# Patient Record
Sex: MALE | URBAN - METROPOLITAN AREA
[De-identification: names, ages, dates, MRNs, and addresses within clinical notes are randomized per-mention and may not be internally consistent; named-entity substitution may affect disease eponyms.]

---

## 2023-10-02 PROBLEM — E66.01 OBESITY, CLASS III, BMI 40-49.9 (MORBID OBESITY) (MULTI): Status: ACTIVE | Noted: 2023-10-02

## 2023-10-02 PROBLEM — S90.221A SUBUNGUAL HEMATOMA OF TOENAIL OF RIGHT FOOT: Status: ACTIVE | Noted: 2023-10-02

## 2023-10-02 PROBLEM — E66.813 OBESITY, CLASS III, BMI 40-49.9 (MORBID OBESITY): Status: ACTIVE | Noted: 2023-10-02

## 2023-10-03 ENCOUNTER — OFFICE VISIT (OUTPATIENT)
Dept: PRIMARY CARE | Facility: CLINIC | Age: 33
End: 2023-10-03
Payer: COMMERCIAL

## 2023-10-03 VITALS
HEART RATE: 89 BPM | BODY MASS INDEX: 40.66 KG/M2 | OXYGEN SATURATION: 95 % | HEIGHT: 72 IN | WEIGHT: 300.2 LBS | TEMPERATURE: 97.1 F

## 2023-10-03 DIAGNOSIS — J02.9 PHARYNGITIS, UNSPECIFIED ETIOLOGY: Primary | ICD-10-CM

## 2023-10-03 PROCEDURE — 99213 OFFICE O/P EST LOW 20 MIN: CPT | Performed by: STUDENT IN AN ORGANIZED HEALTH CARE EDUCATION/TRAINING PROGRAM

## 2023-10-03 PROCEDURE — 1036F TOBACCO NON-USER: CPT | Performed by: STUDENT IN AN ORGANIZED HEALTH CARE EDUCATION/TRAINING PROGRAM

## 2023-10-03 RX ORDER — LANOLIN ALCOHOL/MO/W.PET/CERES
1 CREAM (GRAM) TOPICAL DAILY
COMMUNITY

## 2023-10-03 RX ORDER — MULTIVITAMIN
1 TABLET ORAL DAILY
COMMUNITY

## 2023-10-03 RX ORDER — AMOXICILLIN AND CLAVULANATE POTASSIUM 875; 125 MG/1; MG/1
875 TABLET, FILM COATED ORAL 2 TIMES DAILY
Qty: 20 TABLET | Refills: 0 | Status: SHIPPED | OUTPATIENT
Start: 2023-10-03 | End: 2023-10-13

## 2023-10-03 RX ORDER — ASPIRIN 325 MG
1 TABLET, DELAYED RELEASE (ENTERIC COATED) ORAL DAILY
COMMUNITY

## 2023-10-03 ASSESSMENT — ENCOUNTER SYMPTOMS
HOARSE VOICE: 0
COUGH: 0
VOMITING: 0
SHORTNESS OF BREATH: 0
NECK PAIN: 0
TROUBLE SWALLOWING: 0
DIARRHEA: 0
HEADACHES: 0
ABDOMINAL PAIN: 0
SWOLLEN GLANDS: 0
STRIDOR: 0
SORE THROAT: 1

## 2023-10-03 ASSESSMENT — PAIN SCALES - GENERAL: PAINLEVEL: 4

## 2023-10-03 NOTE — PROGRESS NOTES
"Subjective   Patient ID: Wisam Corley is a 32 y.o. male who presents for Sore Throat.    Sore Throat   This is a new problem. The current episode started in the past 7 days. The problem has been gradually worsening. There has been no fever. The pain is at a severity of 3/10. The pain is mild. Pertinent negatives include no abdominal pain, congestion, coughing, diarrhea, drooling, ear discharge, ear pain, headaches, hoarse voice, plugged ear sensation, neck pain, shortness of breath, stridor, swollen glands, trouble swallowing or vomiting. He has tried nothing for the symptoms.        Review of Systems   HENT:  Positive for sore throat. Negative for congestion, drooling, ear discharge, ear pain, hoarse voice and trouble swallowing.    Respiratory:  Negative for cough, shortness of breath and stridor.    Gastrointestinal:  Negative for abdominal pain, diarrhea and vomiting.   Musculoskeletal:  Negative for neck pain.   Neurological:  Negative for headaches.       Objective   Pulse 89   Temp 36.2 °C (97.1 °F)   Ht 1.816 m (5' 11.5\")   Wt 136 kg (300 lb 3.2 oz)   SpO2 95%   BMI 41.29 kg/m²     Physical Exam  HENT:      Head: Normocephalic and atraumatic.      Right Ear: Tympanic membrane, ear canal and external ear normal.      Left Ear: Tympanic membrane, ear canal and external ear normal.      Nose: Nose normal.      Mouth/Throat:      Mouth: Mucous membranes are moist.      Pharynx: Oropharyngeal exudate and posterior oropharyngeal erythema present.   Eyes:      Conjunctiva/sclera: Conjunctivae normal.   Cardiovascular:      Rate and Rhythm: Normal rate and regular rhythm.      Pulses: Normal pulses.   Pulmonary:      Effort: Pulmonary effort is normal.      Breath sounds: Normal breath sounds.   Abdominal:      General: Abdomen is flat.      Palpations: Abdomen is soft.   Skin:     General: Skin is warm and dry.   Neurological:      General: No focal deficit present.      Mental Status: He is alert. "   Psychiatric:         Mood and Affect: Mood normal.         Behavior: Behavior normal.         Thought Content: Thought content normal.         Judgment: Judgment normal.         Assessment/Plan   Start the patient on oral antibiotics.  Consistent for concerns of strep throat.  If not better by next week to return for reevaluation

## 2023-12-11 ENCOUNTER — OFFICE VISIT (OUTPATIENT)
Dept: PRIMARY CARE | Facility: CLINIC | Age: 33
End: 2023-12-11
Payer: COMMERCIAL

## 2023-12-11 ENCOUNTER — LAB (OUTPATIENT)
Dept: LAB | Facility: LAB | Age: 33
End: 2023-12-11
Payer: COMMERCIAL

## 2023-12-11 VITALS
HEART RATE: 97 BPM | BODY MASS INDEX: 41.58 KG/M2 | OXYGEN SATURATION: 94 % | WEIGHT: 297 LBS | SYSTOLIC BLOOD PRESSURE: 110 MMHG | DIASTOLIC BLOOD PRESSURE: 62 MMHG | HEIGHT: 71 IN

## 2023-12-11 DIAGNOSIS — J02.0 RECURRENT STREPTOCOCCAL PHARYNGITIS: Primary | ICD-10-CM

## 2023-12-11 DIAGNOSIS — Z13.1 SCREENING FOR DIABETES MELLITUS: ICD-10-CM

## 2023-12-11 DIAGNOSIS — E55.9 VITAMIN D DEFICIENCY: ICD-10-CM

## 2023-12-11 DIAGNOSIS — Z13.29 SCREENING FOR THYROID DISORDER: ICD-10-CM

## 2023-12-11 DIAGNOSIS — J02.0 RECURRENT STREPTOCOCCAL PHARYNGITIS: ICD-10-CM

## 2023-12-11 DIAGNOSIS — Z13.220 SCREENING FOR LIPID DISORDERS: ICD-10-CM

## 2023-12-11 PROBLEM — B35.4 DERMATOPHYTOSIS OF BODY: Status: ACTIVE | Noted: 2023-12-11

## 2023-12-11 PROBLEM — J45.909 ASTHMA (HHS-HCC): Status: ACTIVE | Noted: 2023-12-11

## 2023-12-11 PROBLEM — S66.126A LACERATION OF FLEXOR MUSCLE, FASCIA AND TENDON OF RIGHT LITTLE FINGER AT WRIST AND HAND LEVEL, INITIAL ENCOUNTER: Status: ACTIVE | Noted: 2020-11-10

## 2023-12-11 LAB
25(OH)D3 SERPL-MCNC: 16 NG/ML (ref 30–100)
ALBUMIN SERPL BCP-MCNC: 4.7 G/DL (ref 3.4–5)
ALP SERPL-CCNC: 45 U/L (ref 33–120)
ALT SERPL W P-5'-P-CCNC: 20 U/L (ref 10–52)
ANION GAP SERPL CALC-SCNC: 10 MMOL/L (ref 10–20)
AST SERPL W P-5'-P-CCNC: 18 U/L (ref 9–39)
BASOPHILS # BLD AUTO: 0.05 X10*3/UL (ref 0–0.1)
BASOPHILS NFR BLD AUTO: 0.4 %
BILIRUB SERPL-MCNC: 0.3 MG/DL (ref 0–1.2)
BUN SERPL-MCNC: 23 MG/DL (ref 6–23)
CALCIUM SERPL-MCNC: 9.9 MG/DL (ref 8.6–10.3)
CHLORIDE SERPL-SCNC: 99 MMOL/L (ref 98–107)
CHOLEST SERPL-MCNC: 242 MG/DL (ref 0–199)
CHOLESTEROL/HDL RATIO: 6.5
CO2 SERPL-SCNC: 32 MMOL/L (ref 21–32)
CREAT SERPL-MCNC: 1.07 MG/DL (ref 0.5–1.3)
EOSINOPHIL # BLD AUTO: 0.11 X10*3/UL (ref 0–0.7)
EOSINOPHIL NFR BLD AUTO: 0.9 %
ERYTHROCYTE [DISTWIDTH] IN BLOOD BY AUTOMATED COUNT: 12.5 % (ref 11.5–14.5)
GFR SERPL CREATININE-BSD FRML MDRD: >90 ML/MIN/1.73M*2
GLUCOSE SERPL-MCNC: 98 MG/DL (ref 74–99)
HCT VFR BLD AUTO: 46 % (ref 41–52)
HDLC SERPL-MCNC: 37.1 MG/DL
HGB BLD-MCNC: 15.1 G/DL (ref 13.5–17.5)
IMM GRANULOCYTES # BLD AUTO: 0.03 X10*3/UL (ref 0–0.7)
IMM GRANULOCYTES NFR BLD AUTO: 0.2 % (ref 0–0.9)
LDLC SERPL CALC-MCNC: 134 MG/DL
LYMPHOCYTES # BLD AUTO: 2.4 X10*3/UL (ref 1.2–4.8)
LYMPHOCYTES NFR BLD AUTO: 19.1 %
MCH RBC QN AUTO: 29.8 PG (ref 26–34)
MCHC RBC AUTO-ENTMCNC: 32.8 G/DL (ref 32–36)
MCV RBC AUTO: 91 FL (ref 80–100)
MONOCYTES # BLD AUTO: 0.72 X10*3/UL (ref 0.1–1)
MONOCYTES NFR BLD AUTO: 5.7 %
NEUTROPHILS # BLD AUTO: 9.27 X10*3/UL (ref 1.2–7.7)
NEUTROPHILS NFR BLD AUTO: 73.7 %
NON HDL CHOLESTEROL: 205 MG/DL (ref 0–149)
NRBC BLD-RTO: 0 /100 WBCS (ref 0–0)
PLATELET # BLD AUTO: 363 X10*3/UL (ref 150–450)
POTASSIUM SERPL-SCNC: 4.3 MMOL/L (ref 3.5–5.3)
PROT SERPL-MCNC: 7.8 G/DL (ref 6.4–8.2)
RBC # BLD AUTO: 5.06 X10*6/UL (ref 4.5–5.9)
SODIUM SERPL-SCNC: 137 MMOL/L (ref 136–145)
TRIGL SERPL-MCNC: 355 MG/DL (ref 0–149)
TSH SERPL-ACNC: 1.11 MIU/L (ref 0.44–3.98)
VLDL: 71 MG/DL (ref 0–40)
WBC # BLD AUTO: 12.6 X10*3/UL (ref 4.4–11.3)

## 2023-12-11 PROCEDURE — 99213 OFFICE O/P EST LOW 20 MIN: CPT | Performed by: STUDENT IN AN ORGANIZED HEALTH CARE EDUCATION/TRAINING PROGRAM

## 2023-12-11 PROCEDURE — 36415 COLL VENOUS BLD VENIPUNCTURE: CPT

## 2023-12-11 PROCEDURE — 1036F TOBACCO NON-USER: CPT | Performed by: STUDENT IN AN ORGANIZED HEALTH CARE EDUCATION/TRAINING PROGRAM

## 2023-12-11 PROCEDURE — 82306 VITAMIN D 25 HYDROXY: CPT

## 2023-12-11 PROCEDURE — 84443 ASSAY THYROID STIM HORMONE: CPT

## 2023-12-11 PROCEDURE — 80053 COMPREHEN METABOLIC PANEL: CPT

## 2023-12-11 PROCEDURE — 85025 COMPLETE CBC W/AUTO DIFF WBC: CPT

## 2023-12-11 PROCEDURE — 80061 LIPID PANEL: CPT

## 2023-12-11 RX ORDER — AMOXICILLIN AND CLAVULANATE POTASSIUM 875; 125 MG/1; MG/1
875 TABLET, FILM COATED ORAL 2 TIMES DAILY
Qty: 20 TABLET | Refills: 0 | Status: SHIPPED | OUTPATIENT
Start: 2023-12-11 | End: 2023-12-21

## 2023-12-11 ASSESSMENT — ENCOUNTER SYMPTOMS
VOMITING: 0
ARTHRALGIAS: 0
WHEEZING: 0
SHORTNESS OF BREATH: 0
FATIGUE: 0
JOINT SWELLING: 0
ABDOMINAL PAIN: 0
PALPITATIONS: 0
CONSTIPATION: 0
RHINORRHEA: 0
DIARRHEA: 0
COUGH: 0
CHILLS: 0
FREQUENCY: 0
FEVER: 0
DYSURIA: 0
NAUSEA: 0
FACIAL SWELLING: 0
SORE THROAT: 1
DIZZINESS: 0

## 2023-12-11 NOTE — PROGRESS NOTES
"Subjective   Patient ID: Wisam Corley is a 33 y.o. male who presents for Sore Throat (Tonsil pain).    Sore Throat   Pertinent negatives include no abdominal pain, congestion, coughing, diarrhea, ear pain, shortness of breath or vomiting.      Patient here for evaluation of possible tonsil stones.  Does have some sore throat today.  Denies any fever, chest pain, shortness of breath.  Does know that he has recurrent tonsil stones.  States he does get a lot of pharyngitis throughout the year.  Wants to see ENT.  Would like a referral.  However does have active sore throat today.  Has not tried thing for symptomatic relief.    Review of Systems   Constitutional:  Negative for chills, fatigue and fever.   HENT:  Positive for sore throat. Negative for congestion, ear pain, facial swelling, hearing loss and rhinorrhea.    Respiratory:  Negative for cough, shortness of breath and wheezing.    Cardiovascular:  Negative for chest pain and palpitations.   Gastrointestinal:  Negative for abdominal pain, constipation, diarrhea, nausea and vomiting.   Genitourinary:  Negative for dysuria and frequency.   Musculoskeletal:  Negative for arthralgias and joint swelling.   Skin:  Negative for rash.   Neurological:  Negative for dizziness and syncope.       Objective   /62   Pulse 97   Ht 1.803 m (5' 11\")   Wt 135 kg (297 lb)   SpO2 94%   BMI 41.42 kg/m²     Physical Exam  HENT:      Head: Normocephalic and atraumatic.      Right Ear: Tympanic membrane, ear canal and external ear normal.      Left Ear: Tympanic membrane, ear canal and external ear normal.      Nose: Nose normal.      Mouth/Throat:      Mouth: Mucous membranes are moist.      Pharynx: Oropharynx is clear. No posterior oropharyngeal erythema.      Comments: Right tonsil larger than left.  Multiple crypts noted in the tonsil on the right  Eyes:      Conjunctiva/sclera: Conjunctivae normal.   Cardiovascular:      Rate and Rhythm: Normal rate and regular " rhythm.      Pulses: Normal pulses.   Pulmonary:      Effort: Pulmonary effort is normal.      Breath sounds: Normal breath sounds.   Abdominal:      General: Abdomen is flat.      Palpations: Abdomen is soft.   Skin:     General: Skin is warm and dry.   Neurological:      General: No focal deficit present.      Mental Status: He is alert.   Psychiatric:         Mood and Affect: Mood normal.         Behavior: Behavior normal.         Thought Content: Thought content normal.         Judgment: Judgment normal.         Assessment/Plan   Referral to ENT and empirically start antibiotics.

## 2023-12-20 ENCOUNTER — TELEPHONE (OUTPATIENT)
Dept: PRIMARY CARE | Facility: CLINIC | Age: 33
End: 2023-12-20
Payer: COMMERCIAL

## 2023-12-20 NOTE — TELEPHONE ENCOUNTER
----- Message from Mikaela Bui DO sent at 12/14/2023 12:57 PM EST -----  Please call the patient regarding his abnormal result.  Low vitamin D levels and elevated cholesterol.  Recommend starting on vitamin D replacement.  Aim for diet exercise and weight loss.  Okay to start on statin therapy as long as patient is agreeable.  Please in the past back and I will send in the medications.

## 2023-12-21 ENCOUNTER — TELEPHONE (OUTPATIENT)
Dept: PRIMARY CARE | Facility: CLINIC | Age: 33
End: 2023-12-21
Payer: COMMERCIAL

## 2023-12-22 DIAGNOSIS — E78.2 MIXED HYPERLIPIDEMIA: Primary | ICD-10-CM

## 2023-12-22 RX ORDER — ROSUVASTATIN CALCIUM 5 MG/1
5 TABLET, COATED ORAL DAILY
Qty: 90 TABLET | Refills: 1 | Status: SHIPPED | OUTPATIENT
Start: 2023-12-22 | End: 2024-06-19

## 2024-01-22 ENCOUNTER — OFFICE VISIT (OUTPATIENT)
Dept: OTOLARYNGOLOGY | Facility: CLINIC | Age: 34
End: 2024-01-22
Payer: COMMERCIAL

## 2024-01-22 VITALS
SYSTOLIC BLOOD PRESSURE: 115 MMHG | BODY MASS INDEX: 42.98 KG/M2 | TEMPERATURE: 97.3 F | HEIGHT: 71 IN | WEIGHT: 307 LBS | HEART RATE: 83 BPM | DIASTOLIC BLOOD PRESSURE: 75 MMHG

## 2024-01-22 DIAGNOSIS — J02.0 RECURRENT STREPTOCOCCAL PHARYNGITIS: Primary | ICD-10-CM

## 2024-01-22 PROCEDURE — 99203 OFFICE O/P NEW LOW 30 MIN: CPT | Performed by: OTOLARYNGOLOGY

## 2024-01-22 PROCEDURE — 99213 OFFICE O/P EST LOW 20 MIN: CPT | Performed by: OTOLARYNGOLOGY

## 2024-01-22 PROCEDURE — 1036F TOBACCO NON-USER: CPT | Performed by: OTOLARYNGOLOGY

## 2024-01-22 RX ORDER — LORATADINE 10 MG/1
10 TABLET ORAL DAILY
COMMUNITY

## 2024-01-22 RX ORDER — METHYLPREDNISOLONE 4 MG/1
TABLET ORAL
Qty: 21 TABLET | Refills: 0 | Status: SHIPPED | OUTPATIENT
Start: 2024-01-22 | End: 2024-01-29

## 2024-01-22 SDOH — ECONOMIC STABILITY: FOOD INSECURITY: WITHIN THE PAST 12 MONTHS, YOU WORRIED THAT YOUR FOOD WOULD RUN OUT BEFORE YOU GOT MONEY TO BUY MORE.: NEVER TRUE

## 2024-01-22 SDOH — ECONOMIC STABILITY: FOOD INSECURITY: WITHIN THE PAST 12 MONTHS, THE FOOD YOU BOUGHT JUST DIDN'T LAST AND YOU DIDN'T HAVE MONEY TO GET MORE.: NEVER TRUE

## 2024-01-22 ASSESSMENT — LIFESTYLE VARIABLES
HOW OFTEN DO YOU HAVE A DRINK CONTAINING ALCOHOL: MONTHLY OR LESS
AUDIT-C TOTAL SCORE: 1
HOW MANY STANDARD DRINKS CONTAINING ALCOHOL DO YOU HAVE ON A TYPICAL DAY: 1 OR 2
SKIP TO QUESTIONS 9-10: 1
HOW OFTEN DO YOU HAVE SIX OR MORE DRINKS ON ONE OCCASION: NEVER

## 2024-01-22 ASSESSMENT — PATIENT HEALTH QUESTIONNAIRE - PHQ9
2. FEELING DOWN, DEPRESSED OR HOPELESS: NOT AT ALL
SUM OF ALL RESPONSES TO PHQ9 QUESTIONS 1 AND 2: 0
1. LITTLE INTEREST OR PLEASURE IN DOING THINGS: NOT AT ALL

## 2024-01-22 ASSESSMENT — PAIN SCALES - GENERAL: PAINLEVEL: 0-NO PAIN

## 2024-01-22 ASSESSMENT — COLUMBIA-SUICIDE SEVERITY RATING SCALE - C-SSRS
2. HAVE YOU ACTUALLY HAD ANY THOUGHTS OF KILLING YOURSELF?: NO
1. IN THE PAST MONTH, HAVE YOU WISHED YOU WERE DEAD OR WISHED YOU COULD GO TO SLEEP AND NOT WAKE UP?: NO
6. HAVE YOU EVER DONE ANYTHING, STARTED TO DO ANYTHING, OR PREPARED TO DO ANYTHING TO END YOUR LIFE?: NO

## 2024-01-22 ASSESSMENT — ENCOUNTER SYMPTOMS
OCCASIONAL FEELINGS OF UNSTEADINESS: 0
DEPRESSION: 0
LOSS OF SENSATION IN FEET: 0

## 2024-01-22 NOTE — PROGRESS NOTES
ENT New Patient Visit    Chief complaint: Enlarged tonsils    History Of Present Illness  Wisam Corley is a 33 y.o. male presents for evaluation of enlarged tonsils. He had 2 strep infections in oct/nov, and since then his tonsils have been larger than normal. The acute symptoms have resolved but he still feels them there, sometimes feels food gets caught and will spit out some tonsil stones. But no weight loss, no trouble eating. Feels like his snoring has also worsened. No blood thinners, no history of easy bruising.     Past Medical History  He has no past medical history on file.    Surgical History  He has a past surgical history that includes Other surgical history (11/07/2022) and Other surgical history (11/07/2022).     Social History  He reports that he has quit smoking. His smoking use included cigarettes. He has quit using smokeless tobacco.  His smokeless tobacco use included chew. He reports current alcohol use. He reports that he does not use drugs.    Family History  Family History   Problem Relation Name Age of Onset    No Known Problems Mother      No Known Problems Father          Allergies  Fish containing products, Iodine, Shellfish containing products, and Shrimp    Review of Systems     Physical Exam:  CONSTITUTIONAL:  No acute distress  VOICE:  No hoarseness or other abnormality  RESPIRATION:  Breathing comfortably, no stridor  CV:  No clubbing/cyanosis/edema in hands  EYES:  EOM intact, sclera normal  NEURO:  Alert and oriented times 3, Cranial nerves II-XII grossly intact and symmetric bilaterally  HEAD AND FACE:  Symmetric facial features, no masses or lesions, sinuses non-tender to palpation  SALIVARY GLANDS:  Parotid and submandibular glands normal bilaterally  EARS:  Normal external ears, external auditory canals, and TMs to otoscopy, normal hearing to whispered voice.  NOSE:  External nose midline, anterior rhinoscopy is normal with limited visualization to the anterior aspect of the  "interior turbinates, no bleeding or drainage, no lesions  ORAL CAVITY/OROPHARYNX/LIPS:  Normal mucous membranes, normal floor of mouth/tongue/OP, no masses or lesions; tonsils are 2+ bl, slightly more exophytic on the right, posterior pharyngeal wall cobblestoning  PHARYNGEAL WALLS:  No masses or lesions  NECK/LYMPH:  No LAD, no thyroid masses, trachea midline  SKIN:  Neck skin is without scar or injury  PSYCH:  Alert and oriented with appropriate mood and affect     Last Recorded Vitals  Blood pressure 115/75, pulse 83, temperature 36.3 °C (97.3 °F), temperature source Temporal, height 1.803 m (5' 11\"), weight 139 kg (307 lb).    Assessment and Plan  33 y.o. male with enlarged tonsils are recent strep infection  -trial of medrol dose ty  -discussed with pt that from a symptom stand point, I'm not sure removing tonsils will definitely help with his snoring  -we spoke about some of the more clear indications, which is frequent recurrent strep infections 3-5/year, missing school/work often, peritonsillar abscess; he doesn't have these indications  -I presented the risks of tonsillectomy which is significant post-op pain for 2-3 weeks, risk of bleeding, sometimes lifethreatening  -ultimately it's up to the pt since he's an adult, if they are bothersome enough and he understands risks, we can proceed  -he will think about it and call me back after trial of medrol dose pack    Shen Little  [id number]    Shen Little MD    "

## 2024-04-15 ENCOUNTER — OFFICE VISIT (OUTPATIENT)
Dept: URGENT CARE | Facility: CLINIC | Age: 34
End: 2024-04-15
Payer: COMMERCIAL

## 2024-04-15 VITALS
TEMPERATURE: 97.7 F | RESPIRATION RATE: 18 BRPM | SYSTOLIC BLOOD PRESSURE: 118 MMHG | HEART RATE: 87 BPM | DIASTOLIC BLOOD PRESSURE: 74 MMHG | OXYGEN SATURATION: 98 %

## 2024-04-15 DIAGNOSIS — J30.2 SEASONAL ALLERGIES: Primary | ICD-10-CM

## 2024-04-15 DIAGNOSIS — R06.2 WHEEZING: ICD-10-CM

## 2024-04-15 PROCEDURE — 99204 OFFICE O/P NEW MOD 45 MIN: CPT | Performed by: PHYSICIAN ASSISTANT

## 2024-04-15 RX ORDER — PREDNISONE 10 MG/1
50 TABLET ORAL DAILY
Qty: 25 TABLET | Refills: 0 | Status: SHIPPED | OUTPATIENT
Start: 2024-04-15 | End: 2024-04-20

## 2024-04-15 ASSESSMENT — ENCOUNTER SYMPTOMS
APPETITE CHANGE: 0
SORE THROAT: 0
SHORTNESS OF BREATH: 1
FEVER: 0
HEMATOLOGIC/LYMPHATIC NEGATIVE: 1
NAUSEA: 0
BACK PAIN: 0
SINUS PRESSURE: 1
VOMITING: 0
ALLERGIC/IMMUNOLOGIC NEGATIVE: 1
ENDOCRINE NEGATIVE: 1
EYE PAIN: 0
WOUND: 0
ACTIVITY CHANGE: 0
ABDOMINAL PAIN: 0
PSYCHIATRIC NEGATIVE: 1
SINUS PAIN: 1
FATIGUE: 0
COLOR CHANGE: 0
DIARRHEA: 0
RHINORRHEA: 1
ARTHRALGIAS: 0
EYE DISCHARGE: 0
WHEEZING: 1
BLOOD IN STOOL: 0
NEUROLOGICAL NEGATIVE: 1
EYE REDNESS: 0
COUGH: 1

## 2024-04-15 ASSESSMENT — PAIN SCALES - GENERAL: PAINLEVEL: 0-NO PAIN

## 2024-04-15 NOTE — PATIENT INSTRUCTIONS
Assessment/Plan   Problem List Items Addressed This Visit       Seasonal allergies - Primary    Relevant Medications    predniSONE (Deltasone) 10 mg tablet    Wheezing    Relevant Medications    predniSONE (Deltasone) 10 mg tablet      Recommending patient start using Claritin-D, discontinue the Sudafed PE, continue the Flonase, I am also sending a short burst of prednisone for the patient.  If sinus pain and pressure does not resolve over the course of the next 5 days recommending being reseen for assessment of need of treatment for bacterial sinusitis but at this time unlikely to require antibiotic therapy.  Further recommendations made of long steamy showers, nasal saline irrigation

## 2024-04-15 NOTE — PROGRESS NOTES
Subjective   Patient ID: Wisam Corley is a 33 y.o. male who presents for Allergies (Pt co of sneezing, sinus drainage, cough x 5 days).  Patient notes that he also has a history of asthma and his allergies seem to be exacerbating his asthma has had to use his rescue inhaler more frequently than usual.  He endorses wheezing, sinus pressure and pain purulent nasal discharge, cough scratchy throat postnasal drip.  He has been using loratadine Flonase and Sudafed PE without significant improvement      Review of Systems   Constitutional:  Negative for activity change, appetite change, fatigue and fever.   HENT:  Positive for congestion, rhinorrhea, sinus pressure and sinus pain. Negative for sore throat.    Eyes:  Negative for pain, discharge and redness.   Respiratory:  Positive for cough, shortness of breath and wheezing.    Cardiovascular:  Negative for chest pain and leg swelling.   Gastrointestinal:  Negative for abdominal pain, blood in stool, diarrhea, nausea and vomiting.   Endocrine: Negative.    Musculoskeletal:  Negative for arthralgias, back pain and gait problem.   Skin:  Negative for color change, rash and wound.   Allergic/Immunologic: Negative.    Neurological: Negative.    Hematological: Negative.    Psychiatric/Behavioral: Negative.         Objective   /74   Pulse 87   Temp 36.5 °C (97.7 °F)   Resp 18   SpO2 98%   Physical Exam  Vitals reviewed.   Constitutional:       General: He is not in acute distress.     Appearance: Normal appearance. He is not toxic-appearing.   HENT:      Head: Normocephalic.      Right Ear: Tympanic membrane and ear canal normal. No tenderness. No mastoid tenderness.      Left Ear: Tympanic membrane and ear canal normal. No tenderness. No mastoid tenderness.      Nose: Congestion and rhinorrhea present.      Mouth/Throat:      Mouth: Mucous membranes are moist.      Pharynx: Oropharynx is clear. No posterior oropharyngeal erythema.   Eyes:      Conjunctiva/sclera:  Conjunctivae normal.      Pupils: Pupils are equal, round, and reactive to light.   Cardiovascular:      Rate and Rhythm: Normal rate and regular rhythm.      Heart sounds: No murmur heard.  Pulmonary:      Effort: No respiratory distress.      Breath sounds: No stridor. Wheezing present. No rhonchi or rales.   Chest:      Chest wall: No tenderness.   Abdominal:      Tenderness: There is no abdominal tenderness. There is no guarding.   Musculoskeletal:         General: Normal range of motion.   Skin:     General: Skin is warm and dry.      Capillary Refill: Capillary refill takes less than 2 seconds.      Findings: No erythema.   Neurological:      General: No focal deficit present.      Mental Status: He is alert.         Assessment/Plan   Problem List Items Addressed This Visit       Seasonal allergies - Primary    Relevant Medications    predniSONE (Deltasone) 10 mg tablet    Wheezing    Relevant Medications    predniSONE (Deltasone) 10 mg tablet      Recommending patient start using Claritin-D, discontinue the Sudafed PE, continue the Flonase, I am also sending a short burst of prednisone for the patient.  If sinus pain and pressure does not resolve over the course of the next 5 days recommending being reseen for assessment of need of treatment for bacterial sinusitis but at this time unlikely to require antibiotic therapy.  Further recommendations made of long steamy showers, nasal saline irrigation

## 2024-06-04 ENCOUNTER — APPOINTMENT (OUTPATIENT)
Dept: PRIMARY CARE | Facility: CLINIC | Age: 34
End: 2024-06-04
Payer: COMMERCIAL

## 2024-06-18 ENCOUNTER — LAB (OUTPATIENT)
Dept: LAB | Facility: LAB | Age: 34
End: 2024-06-18
Payer: COMMERCIAL

## 2024-06-18 ENCOUNTER — APPOINTMENT (OUTPATIENT)
Dept: PRIMARY CARE | Facility: CLINIC | Age: 34
End: 2024-06-18
Payer: COMMERCIAL

## 2024-06-18 VITALS
DIASTOLIC BLOOD PRESSURE: 70 MMHG | HEART RATE: 91 BPM | WEIGHT: 300 LBS | SYSTOLIC BLOOD PRESSURE: 110 MMHG | HEIGHT: 71 IN | BODY MASS INDEX: 42 KG/M2

## 2024-06-18 DIAGNOSIS — E55.9 VITAMIN D DEFICIENCY: ICD-10-CM

## 2024-06-18 DIAGNOSIS — R79.89 HIGH SERUM FOLLICLE STIMULATING HORMONE (FSH): ICD-10-CM

## 2024-06-18 DIAGNOSIS — E66.01 CLASS 3 SEVERE OBESITY DUE TO EXCESS CALORIES WITH SERIOUS COMORBIDITY AND BODY MASS INDEX (BMI) OF 40.0 TO 44.9 IN ADULT (MULTI): ICD-10-CM

## 2024-06-18 DIAGNOSIS — Z71.3 ENCOUNTER FOR WEIGHT LOSS COUNSELING: ICD-10-CM

## 2024-06-18 DIAGNOSIS — E78.2 MIXED HYPERLIPIDEMIA: Primary | ICD-10-CM

## 2024-06-18 DIAGNOSIS — R79.89 HIGH SERUM LUTEINIZING HORMONE (LH): ICD-10-CM

## 2024-06-18 DIAGNOSIS — Z13.1 DIABETES MELLITUS SCREENING: ICD-10-CM

## 2024-06-18 DIAGNOSIS — E78.2 MIXED HYPERLIPIDEMIA: ICD-10-CM

## 2024-06-18 PROBLEM — E66.813 CLASS 3 SEVERE OBESITY DUE TO EXCESS CALORIES WITH SERIOUS COMORBIDITY AND BODY MASS INDEX (BMI) OF 40.0 TO 44.9 IN ADULT: Status: ACTIVE | Noted: 2024-06-18

## 2024-06-18 LAB
25(OH)D3 SERPL-MCNC: 46 NG/ML (ref 30–100)
ALBUMIN SERPL BCP-MCNC: 4.5 G/DL (ref 3.4–5)
ALP SERPL-CCNC: 37 U/L (ref 33–120)
ALT SERPL W P-5'-P-CCNC: 19 U/L (ref 10–52)
ANION GAP SERPL CALC-SCNC: 11 MMOL/L (ref 10–20)
AST SERPL W P-5'-P-CCNC: 16 U/L (ref 9–39)
BASOPHILS # BLD AUTO: 0.04 X10*3/UL (ref 0–0.1)
BASOPHILS NFR BLD AUTO: 0.3 %
BILIRUB SERPL-MCNC: 0.5 MG/DL (ref 0–1.2)
BUN SERPL-MCNC: 20 MG/DL (ref 6–23)
CALCIUM SERPL-MCNC: 9.5 MG/DL (ref 8.6–10.3)
CHLORIDE SERPL-SCNC: 103 MMOL/L (ref 98–107)
CHOLEST SERPL-MCNC: 172 MG/DL (ref 0–199)
CHOLESTEROL/HDL RATIO: 5.1
CO2 SERPL-SCNC: 31 MMOL/L (ref 21–32)
CREAT SERPL-MCNC: 0.95 MG/DL (ref 0.5–1.3)
EGFRCR SERPLBLD CKD-EPI 2021: >90 ML/MIN/1.73M*2
EOSINOPHIL # BLD AUTO: 0.16 X10*3/UL (ref 0–0.7)
EOSINOPHIL NFR BLD AUTO: 1.4 %
ERYTHROCYTE [DISTWIDTH] IN BLOOD BY AUTOMATED COUNT: 12.6 % (ref 11.5–14.5)
EST. AVERAGE GLUCOSE BLD GHB EST-MCNC: 117 MG/DL
GLUCOSE SERPL-MCNC: 131 MG/DL (ref 74–99)
HBA1C MFR BLD: 5.7 %
HCT VFR BLD AUTO: 44.4 % (ref 41–52)
HDLC SERPL-MCNC: 33.8 MG/DL
HGB BLD-MCNC: 14.5 G/DL (ref 13.5–17.5)
IMM GRANULOCYTES # BLD AUTO: 0.04 X10*3/UL (ref 0–0.7)
IMM GRANULOCYTES NFR BLD AUTO: 0.3 % (ref 0–0.9)
LDLC SERPL CALC-MCNC: 99 MG/DL
LYMPHOCYTES # BLD AUTO: 2.26 X10*3/UL (ref 1.2–4.8)
LYMPHOCYTES NFR BLD AUTO: 19.4 %
MCH RBC QN AUTO: 29.5 PG (ref 26–34)
MCHC RBC AUTO-ENTMCNC: 32.7 G/DL (ref 32–36)
MCV RBC AUTO: 90 FL (ref 80–100)
MONOCYTES # BLD AUTO: 0.69 X10*3/UL (ref 0.1–1)
MONOCYTES NFR BLD AUTO: 5.9 %
NEUTROPHILS # BLD AUTO: 8.48 X10*3/UL (ref 1.2–7.7)
NEUTROPHILS NFR BLD AUTO: 72.7 %
NON HDL CHOLESTEROL: 138 MG/DL (ref 0–149)
NRBC BLD-RTO: 0 /100 WBCS (ref 0–0)
PLATELET # BLD AUTO: 316 X10*3/UL (ref 150–450)
POTASSIUM SERPL-SCNC: 4.6 MMOL/L (ref 3.5–5.3)
PROT SERPL-MCNC: 6.9 G/DL (ref 6.4–8.2)
RBC # BLD AUTO: 4.91 X10*6/UL (ref 4.5–5.9)
SODIUM SERPL-SCNC: 140 MMOL/L (ref 136–145)
TRIGL SERPL-MCNC: 194 MG/DL (ref 0–149)
VLDL: 39 MG/DL (ref 0–40)
WBC # BLD AUTO: 11.7 X10*3/UL (ref 4.4–11.3)

## 2024-06-18 PROCEDURE — 84402 ASSAY OF FREE TESTOSTERONE: CPT

## 2024-06-18 PROCEDURE — 83036 HEMOGLOBIN GLYCOSYLATED A1C: CPT

## 2024-06-18 PROCEDURE — 99213 OFFICE O/P EST LOW 20 MIN: CPT | Performed by: INTERNAL MEDICINE

## 2024-06-18 PROCEDURE — 85025 COMPLETE CBC W/AUTO DIFF WBC: CPT

## 2024-06-18 PROCEDURE — 80053 COMPREHEN METABOLIC PANEL: CPT

## 2024-06-18 PROCEDURE — 82306 VITAMIN D 25 HYDROXY: CPT

## 2024-06-18 PROCEDURE — 3008F BODY MASS INDEX DOCD: CPT | Performed by: INTERNAL MEDICINE

## 2024-06-18 PROCEDURE — 80061 LIPID PANEL: CPT

## 2024-06-18 PROCEDURE — 36415 COLL VENOUS BLD VENIPUNCTURE: CPT

## 2024-06-18 PROCEDURE — 1036F TOBACCO NON-USER: CPT | Performed by: INTERNAL MEDICINE

## 2024-06-18 RX ORDER — TOPIRAMATE 25 MG/1
25 TABLET ORAL 2 TIMES DAILY
Qty: 60 TABLET | Refills: 5 | Status: SHIPPED | OUTPATIENT
Start: 2024-06-18 | End: 2024-12-15

## 2024-06-18 RX ORDER — PHENTERMINE HYDROCHLORIDE 37.5 MG/1
37.5 TABLET ORAL
Qty: 30 TABLET | Refills: 0 | Status: SHIPPED | OUTPATIENT
Start: 2024-06-18 | End: 2024-07-18

## 2024-06-18 RX ORDER — ROSUVASTATIN CALCIUM 5 MG/1
5 TABLET, COATED ORAL DAILY
Qty: 90 TABLET | Refills: 1 | Status: SHIPPED | OUTPATIENT
Start: 2024-06-18 | End: 2024-12-15

## 2024-06-18 ASSESSMENT — ENCOUNTER SYMPTOMS
LIGHT-HEADEDNESS: 0
NAUSEA: 0
CHILLS: 0
ABDOMINAL PAIN: 0
CONSTIPATION: 0
SHORTNESS OF BREATH: 0
PALPITATIONS: 0
DIARRHEA: 0
COUGH: 0
VOMITING: 0
DIZZINESS: 0
SORE THROAT: 0
FEVER: 0

## 2024-06-18 NOTE — PROGRESS NOTES
"Subjective   Patient ID: Wisam Corley is a 33 y.o. male who presents for Med Refill (Questions about weight loss).    Med Refill  Pertinent negatives include no abdominal pain, chest pain, chills, coughing, fever, nausea, sore throat or vomiting.    patient presents to the office today for refills on rosuvastatin.  Has a history of hyperlipidemia was started on rosuvastatin December is due for repeat blood work after being on the medication.  Also due for recheck of vitamin D deficiency.  States that he would like to start medication for weight loss.  Has tried conventional weight loss strategies without improvement.  He is interested in Topamax and phentermine combination.  We discussed risk versus benefits of this medication.  He denies any fever, chills, chest pain or shortness of breath with nausea, vomit diarrhea, abdominal pain.  No suicidal or homicidal ideation.  We discussed other options for weight loss as well but he would like to pursue Topamax and phentermine.  Denies any history of drug use.  OARRS report was reviewed and appropriate.  Blood pressure well-controlled.  Heart rate within normal limits.    Review of Systems   Constitutional:  Negative for chills and fever.   HENT:  Negative for sore throat.    Eyes:  Negative for visual disturbance.   Respiratory:  Negative for cough and shortness of breath.    Cardiovascular:  Negative for chest pain, palpitations and leg swelling.   Gastrointestinal:  Negative for abdominal pain, constipation, diarrhea, nausea and vomiting.   Neurological:  Negative for dizziness, syncope and light-headedness.       Objective   /70   Pulse 91   Ht 1.803 m (5' 11\")   Wt 136 kg (300 lb)   BMI 41.84 kg/m²     Physical Exam  Vitals and nursing note reviewed.   Constitutional:       General: He is not in acute distress.     Appearance: Normal appearance. He is obese. He is not ill-appearing, toxic-appearing or diaphoretic.   HENT:      Head: Normocephalic and " atraumatic.      Mouth/Throat:      Mouth: Mucous membranes are moist.      Pharynx: Oropharynx is clear. No oropharyngeal exudate.   Eyes:      Pupils: Pupils are equal, round, and reactive to light.   Cardiovascular:      Rate and Rhythm: Normal rate and regular rhythm.   Pulmonary:      Effort: Pulmonary effort is normal. No respiratory distress.      Breath sounds: Normal breath sounds. No wheezing, rhonchi or rales.   Abdominal:      General: Bowel sounds are normal. There is no distension.      Palpations: Abdomen is soft.   Musculoskeletal:      Cervical back: Neck supple. No tenderness.      Right lower leg: No edema.      Left lower leg: No edema.   Skin:     General: Skin is warm and dry.      Coloration: Skin is not jaundiced or pale.      Findings: No rash.   Neurological:      General: No focal deficit present.      Mental Status: He is alert and oriented to person, place, and time.      Cranial Nerves: No cranial nerve deficit.   Psychiatric:         Mood and Affect: Mood normal.         Behavior: Behavior normal.         Thought Content: Thought content normal.         Judgment: Judgment normal.         Assessment/Plan   Problem List Items Addressed This Visit             ICD-10-CM    Vitamin D deficiency E55.9    Relevant Orders    Vitamin D 25-Hydroxy,Total (for eval of Vitamin D levels)    High serum luteinizing hormone (LH) R79.89    Relevant Orders    Testosterone, total and free    High serum follicle stimulating hormone (FSH) R79.89    Relevant Orders    Testosterone, total and free    Mixed hyperlipidemia - Primary E78.2    Relevant Medications    rosuvastatin (Crestor) 5 mg tablet    topiramate (Topamax) 25 mg tablet    phentermine (Adipex-P) 37.5 mg tablet    Other Relevant Orders    CBC and Auto Differential    Comprehensive metabolic panel    Lipid panel    Class 3 severe obesity due to excess calories with serious comorbidity and body mass index (BMI) of 40.0 to 44.9 in adult (Multi)  E66.01, Z68.41    Relevant Medications    topiramate (Topamax) 25 mg tablet    phentermine (Adipex-P) 37.5 mg tablet    Other Relevant Orders    Hemoglobin A1C    Diabetes mellitus screening Z13.1    Relevant Orders    Hemoglobin A1C    Encounter for weight loss counseling Z71.3     Mixed hyperlipidemia: We will check a lipid panel will continue Crestor.  Will start him on Topamax and Adipex for weight loss.    Counter for weight loss counseling/severe obesity: Will start Topamax and phentermine after reviewing different weight loss medications.  Discussed diet and exercise modifications.  Exercising 1 hour each day and lean meats green leafy vegetables decreasing breads, pasta, rice in the diet.  Follow-up in 1 month for recheck.  OARRS report is reviewed and appropriate.    Diabetes mellitus screening: Check hemoglobin A1c    Vitamin D deficiency: We will check a vitamin D states he is taking daily vitamin D supplement    High serum LH and high serum follicle-stimulating hormone: We will check testosterone levels states he saw a urologist for infertility evaluation and was told that he has low chance of conceiving based upon undetectable sperm count and the fact that he has elevated FSH and LH in his blood.

## 2024-06-19 DIAGNOSIS — R79.89 LOW TESTOSTERONE IN MALE: ICD-10-CM

## 2024-06-19 DIAGNOSIS — N46.9 MALE INFERTILITY: Primary | ICD-10-CM

## 2024-06-19 NOTE — RESULT ENCOUNTER NOTE
Labs showed elevated sugar levels in the prediabetes range, improved cholesterol and triglycerides.  Normal vitamin D. Patient's white blood cell counts is mildly elevated similar to prior labs but improved.

## 2024-06-20 ENCOUNTER — TELEPHONE (OUTPATIENT)
Dept: PRIMARY CARE | Facility: CLINIC | Age: 34
End: 2024-06-20
Payer: COMMERCIAL

## 2024-06-20 NOTE — TELEPHONE ENCOUNTER
----- Message from Sal Luis DO sent at 6/19/2024 10:08 AM EDT -----  Labs showed elevated sugar levels in the prediabetes range, improved cholesterol and triglycerides.  Normal vitamin D. Patient's white blood cell counts is mildly elevated similar to prior labs but improved.

## 2024-06-24 LAB
TESTOSTERONE FREE (CHAN): 26.9 PG/ML (ref 35–155)
TESTOSTERONE,TOTAL,LC-MS/MS: 135 NG/DL (ref 250–1100)

## 2024-06-25 ENCOUNTER — TELEPHONE (OUTPATIENT)
Dept: PRIMARY CARE | Facility: CLINIC | Age: 34
End: 2024-06-25
Payer: COMMERCIAL

## 2024-06-25 PROBLEM — E66.01 SEVERE OBESITY (BMI >= 40) (MULTI): Status: ACTIVE | Noted: 2024-06-25

## 2024-06-25 NOTE — TELEPHONE ENCOUNTER
----- Message from Sal Luis DO sent at 6/25/2024  9:56 AM EDT -----  Patient's testosterone is low. I am referring him to a urologist in the  system that treats infertility and low testosterone. See referral.

## 2024-06-25 NOTE — RESULT ENCOUNTER NOTE
Patient's testosterone is low. I am referring him to a urologist in the  system that treats infertility and low testosterone. See referral.

## 2024-07-18 ENCOUNTER — APPOINTMENT (OUTPATIENT)
Dept: PRIMARY CARE | Facility: CLINIC | Age: 34
End: 2024-07-18
Payer: COMMERCIAL

## 2024-07-18 VITALS
HEIGHT: 71 IN | TEMPERATURE: 98 F | SYSTOLIC BLOOD PRESSURE: 114 MMHG | BODY MASS INDEX: 38.5 KG/M2 | WEIGHT: 275 LBS | HEART RATE: 94 BPM | DIASTOLIC BLOOD PRESSURE: 80 MMHG

## 2024-07-18 DIAGNOSIS — E78.2 MIXED HYPERLIPIDEMIA: Primary | ICD-10-CM

## 2024-07-18 DIAGNOSIS — Z71.3 ENCOUNTER FOR WEIGHT LOSS COUNSELING: ICD-10-CM

## 2024-07-18 DIAGNOSIS — E66.01 CLASS 2 SEVERE OBESITY DUE TO EXCESS CALORIES WITH SERIOUS COMORBIDITY AND BODY MASS INDEX (BMI) OF 38.0 TO 38.9 IN ADULT (MULTI): ICD-10-CM

## 2024-07-18 DIAGNOSIS — R73.03 PREDIABETES: ICD-10-CM

## 2024-07-18 PROBLEM — E66.812 CLASS 2 SEVERE OBESITY DUE TO EXCESS CALORIES WITH SERIOUS COMORBIDITY AND BODY MASS INDEX (BMI) OF 38.0 TO 38.9 IN ADULT: Status: ACTIVE | Noted: 2024-07-18

## 2024-07-18 PROCEDURE — 3008F BODY MASS INDEX DOCD: CPT | Performed by: INTERNAL MEDICINE

## 2024-07-18 PROCEDURE — 99213 OFFICE O/P EST LOW 20 MIN: CPT | Performed by: INTERNAL MEDICINE

## 2024-07-18 PROCEDURE — 1036F TOBACCO NON-USER: CPT | Performed by: INTERNAL MEDICINE

## 2024-07-18 RX ORDER — FLUTICASONE PROPIONATE 50 MCG
SPRAY, SUSPENSION (ML) NASAL EVERY 12 HOURS
COMMUNITY

## 2024-07-18 RX ORDER — PHENTERMINE HYDROCHLORIDE 37.5 MG/1
37.5 TABLET ORAL
Qty: 30 TABLET | Refills: 0 | Status: SHIPPED | OUTPATIENT
Start: 2024-07-18 | End: 2024-08-17

## 2024-07-18 ASSESSMENT — ENCOUNTER SYMPTOMS
VOMITING: 0
CHILLS: 0
CONFUSION: 0
COUGH: 0
FEVER: 0
LOSS OF SENSATION IN FEET: 0
SORE THROAT: 0
AGITATION: 0
NAUSEA: 0
OCCASIONAL FEELINGS OF UNSTEADINESS: 0
DIZZINESS: 0
DEPRESSION: 0
ABDOMINAL PAIN: 0
SHORTNESS OF BREATH: 0

## 2024-07-18 ASSESSMENT — PATIENT HEALTH QUESTIONNAIRE - PHQ9
2. FEELING DOWN, DEPRESSED OR HOPELESS: NOT AT ALL
1. LITTLE INTEREST OR PLEASURE IN DOING THINGS: NOT AT ALL
SUM OF ALL RESPONSES TO PHQ9 QUESTIONS 1 AND 2: 0

## 2024-07-18 NOTE — PROGRESS NOTES
"Subjective   Patient ID: Wisam Corley is a 33 y.o. male who presents for Follow-up (1 month ).    HPI Patient has lost 25lbs since his last office visit 1 month ago on Phentermine and Topamax. Patient has noticed dry mouth and dry eyes but  is tolerating it well otherwise.  Denies any fever, chills, chest pain or shortness of breath, nausea, vomiting, palpitations.  States initially had very low appetite but his appetite is coming back and he is drinking more water whereas initially he had lower water intake.  We took time today to review his most recent blood work with him as well His lipid panel showed tremendous control of his cholesterol with exception of some mild elevation of his triglycerides he had mild elevation hemoglobin A1c of 5.7% he does have low testosterone for which he did receive a referral to urology for further evaluation for this as he has a history of infertility.  Vitamin D was normal patient's CBC with differential showed mild elevation of white blood cell count of 11.7 otherwise was normal    Review of Systems   Constitutional:  Negative for chills and fever.   HENT:  Negative for sore throat.    Eyes:  Negative for visual disturbance.   Respiratory:  Negative for cough and shortness of breath.    Cardiovascular:  Negative for chest pain.   Gastrointestinal:  Negative for abdominal pain, nausea and vomiting.   Skin:  Negative for rash.   Neurological:  Negative for dizziness.   Psychiatric/Behavioral:  Negative for agitation and confusion.        Objective   /80   Pulse 94   Temp 36.7 °C (98 °F) (Temporal)   Ht 1.803 m (5' 11\")   Wt 125 kg (275 lb)   BMI 38.35 kg/m²     Physical Exam  Vitals and nursing note reviewed.   Constitutional:       General: He is not in acute distress.     Appearance: Normal appearance. He is obese. He is not ill-appearing, toxic-appearing or diaphoretic.   HENT:      Head: Normocephalic and atraumatic.      Mouth/Throat:      Mouth: Mucous membranes " are moist.      Pharynx: Oropharynx is clear. No oropharyngeal exudate.   Eyes:      Pupils: Pupils are equal, round, and reactive to light.   Cardiovascular:      Rate and Rhythm: Normal rate and regular rhythm.      Heart sounds: Normal heart sounds.   Pulmonary:      Effort: Pulmonary effort is normal. No respiratory distress.      Breath sounds: Normal breath sounds. No wheezing or rales.   Musculoskeletal:      Cervical back: Neck supple.      Right lower leg: No edema.      Left lower leg: No edema.   Skin:     General: Skin is warm and dry.      Coloration: Skin is not jaundiced or pale.      Findings: No rash.   Neurological:      General: No focal deficit present.      Mental Status: He is alert and oriented to person, place, and time. Mental status is at baseline.      Cranial Nerves: No cranial nerve deficit.   Psychiatric:         Mood and Affect: Mood normal.         Behavior: Behavior normal.         Thought Content: Thought content normal.         Judgment: Judgment normal.         Assessment/Plan   Problem List Items Addressed This Visit             ICD-10-CM    Mixed hyperlipidemia - Primary E78.2    Relevant Medications    phentermine (Adipex-P) 37.5 mg tablet    Encounter for weight loss counseling Z71.3    Prediabetes R73.03    Class 2 severe obesity due to excess calories with serious comorbidity and body mass index (BMI) of 38.0 to 38.9 in adult (Multi) E66.01, Z68.38    Relevant Medications    phentermine (Adipex-P) 37.5 mg tablet     Hyperlipidemia: Chronic, stable patient will be continued on current medication regimen of rosuvastatin    Prediabetes: We discussed dietary changes, weight loss    Encounter for weight loss counseling/class II severe obesity: Will continue phentermine and Topamax at this time have him follow-up in 1 month for recheck.  Tolerating medication well without adverse effects.

## 2024-08-15 DIAGNOSIS — E66.01 CLASS 2 SEVERE OBESITY DUE TO EXCESS CALORIES WITH SERIOUS COMORBIDITY AND BODY MASS INDEX (BMI) OF 38.0 TO 38.9 IN ADULT (MULTI): ICD-10-CM

## 2024-08-15 DIAGNOSIS — E78.2 MIXED HYPERLIPIDEMIA: ICD-10-CM

## 2024-08-15 RX ORDER — PHENTERMINE HYDROCHLORIDE 37.5 MG/1
37.5 TABLET ORAL
Qty: 30 TABLET | Refills: 0 | Status: CANCELLED | OUTPATIENT
Start: 2024-08-15 | End: 2024-09-14

## 2024-08-16 DIAGNOSIS — E78.2 MIXED HYPERLIPIDEMIA: ICD-10-CM

## 2024-08-16 DIAGNOSIS — E66.01 CLASS 2 SEVERE OBESITY DUE TO EXCESS CALORIES WITH SERIOUS COMORBIDITY AND BODY MASS INDEX (BMI) OF 38.0 TO 38.9 IN ADULT (MULTI): ICD-10-CM

## 2024-08-20 ENCOUNTER — APPOINTMENT (OUTPATIENT)
Dept: PRIMARY CARE | Facility: CLINIC | Age: 34
End: 2024-08-20
Payer: COMMERCIAL

## 2024-08-20 VITALS
WEIGHT: 254 LBS | BODY MASS INDEX: 35.56 KG/M2 | HEART RATE: 82 BPM | TEMPERATURE: 98 F | DIASTOLIC BLOOD PRESSURE: 73 MMHG | HEIGHT: 71 IN | SYSTOLIC BLOOD PRESSURE: 105 MMHG

## 2024-08-20 DIAGNOSIS — E78.2 MIXED HYPERLIPIDEMIA: ICD-10-CM

## 2024-08-20 DIAGNOSIS — Z71.3 ENCOUNTER FOR WEIGHT LOSS COUNSELING: Primary | ICD-10-CM

## 2024-08-20 DIAGNOSIS — H93.8X3 EAR FULLNESS, BILATERAL: ICD-10-CM

## 2024-08-20 DIAGNOSIS — E66.01 CLASS 2 SEVERE OBESITY DUE TO EXCESS CALORIES WITH SERIOUS COMORBIDITY AND BODY MASS INDEX (BMI) OF 35.0 TO 35.9 IN ADULT (MULTI): ICD-10-CM

## 2024-08-20 DIAGNOSIS — R73.03 PREDIABETES: ICD-10-CM

## 2024-08-20 PROCEDURE — 3008F BODY MASS INDEX DOCD: CPT | Performed by: INTERNAL MEDICINE

## 2024-08-20 PROCEDURE — 1036F TOBACCO NON-USER: CPT | Performed by: INTERNAL MEDICINE

## 2024-08-20 PROCEDURE — 99213 OFFICE O/P EST LOW 20 MIN: CPT | Performed by: INTERNAL MEDICINE

## 2024-08-20 RX ORDER — PHENTERMINE HYDROCHLORIDE 37.5 MG/1
37.5 TABLET ORAL
Qty: 30 TABLET | Refills: 0 | OUTPATIENT
Start: 2024-08-20

## 2024-08-20 RX ORDER — PHENTERMINE HYDROCHLORIDE 37.5 MG/1
37.5 TABLET ORAL
Qty: 30 TABLET | Refills: 0 | Status: SHIPPED | OUTPATIENT
Start: 2024-08-20 | End: 2024-09-19

## 2024-08-20 ASSESSMENT — ENCOUNTER SYMPTOMS
SHORTNESS OF BREATH: 0
RHINORRHEA: 0
NAUSEA: 0
CHILLS: 0
SLEEP DISTURBANCE: 0
PALPITATIONS: 0
VOMITING: 0
ABDOMINAL PAIN: 0
DIARRHEA: 0
COUGH: 0
LIGHT-HEADEDNESS: 0
DIZZINESS: 0
FEVER: 0
SORE THROAT: 0

## 2024-08-20 ASSESSMENT — PATIENT HEALTH QUESTIONNAIRE - PHQ9
SUM OF ALL RESPONSES TO PHQ9 QUESTIONS 1 AND 2: 0
2. FEELING DOWN, DEPRESSED OR HOPELESS: NOT AT ALL
1. LITTLE INTEREST OR PLEASURE IN DOING THINGS: NOT AT ALL

## 2024-08-20 NOTE — PROGRESS NOTES
"Subjective   Patient ID: Wisam Corley is a 33 y.o. male who presents for Follow-up (1 month follow up weight loss) and Ear Fullness.    HPI  Patient lost 24 lbs in the last month.  Patient states he is not exercising regularly but is changing his diet. Admits to dry mouth but denies insomnia. No other adverse effects from the medication.  Patient would like to remain on phentermine at this time.  He is doing well with the medication.  Patient tolerating topiramate.  Admits to bilateral ear fullness for the last couple weeks.  He is using Flonase.  States his canal is irritated because he was sticking his finger in his ear.  Denies any fever, chills, chest pain or shortness of breath, nausea or vomiting.  Does have some nasal congestion but no rhinorrhea.  No sore throat.    Review of Systems   Constitutional:  Negative for chills and fever.   HENT:  Positive for congestion. Negative for ear discharge, ear pain, rhinorrhea and sore throat.    Eyes:  Negative for visual disturbance.   Respiratory:  Negative for cough and shortness of breath.    Cardiovascular:  Negative for chest pain, palpitations and leg swelling.   Gastrointestinal:  Negative for abdominal pain, diarrhea, nausea and vomiting.   Skin:  Negative for rash.   Neurological:  Negative for dizziness and light-headedness.   Psychiatric/Behavioral:  Negative for sleep disturbance.        Objective   /73   Pulse 82   Temp 36.7 °C (98 °F) (Temporal)   Ht 1.803 m (5' 11\")   Wt 115 kg (254 lb)   BMI 35.43 kg/m²     Physical Exam  Vitals and nursing note reviewed.   Constitutional:       General: He is not in acute distress.     Appearance: Normal appearance. He is obese. He is not ill-appearing, toxic-appearing or diaphoretic.   HENT:      Head: Normocephalic and atraumatic.      Right Ear: Tympanic membrane, ear canal and external ear normal. There is no impacted cerumen.      Left Ear: Tympanic membrane, ear canal and external ear normal. There " is no impacted cerumen.      Nose: Congestion present.      Mouth/Throat:      Mouth: Mucous membranes are moist.      Pharynx: Oropharynx is clear. No oropharyngeal exudate.   Eyes:      Pupils: Pupils are equal, round, and reactive to light.   Cardiovascular:      Rate and Rhythm: Normal rate and regular rhythm.      Heart sounds: Normal heart sounds.   Pulmonary:      Effort: Pulmonary effort is normal. No respiratory distress.      Breath sounds: Normal breath sounds. No wheezing, rhonchi or rales.   Musculoskeletal:      Cervical back: Neck supple.      Right lower leg: No edema.      Left lower leg: No edema.   Lymphadenopathy:      Cervical: No cervical adenopathy.   Skin:     General: Skin is warm and dry.      Coloration: Skin is not jaundiced or pale.   Neurological:      General: No focal deficit present.      Mental Status: He is alert and oriented to person, place, and time.      Cranial Nerves: No cranial nerve deficit.   Psychiatric:         Mood and Affect: Mood normal.         Behavior: Behavior normal.         Thought Content: Thought content normal.         Judgment: Judgment normal.         Assessment/Plan   Problem List Items Addressed This Visit             ICD-10-CM    Mixed hyperlipidemia E78.2    Relevant Medications    phentermine (Adipex-P) 37.5 mg tablet    Encounter for weight loss counseling - Primary Z71.3    Prediabetes R73.03    Class 2 severe obesity due to excess calories with serious comorbidity and body mass index (BMI) of 38.0 to 38.9 in adult (Multi) E66.01, Z68.38    Relevant Medications    phentermine (Adipex-P) 37.5 mg tablet    Ear fullness, bilateral H93.8X3    Relevant Orders    Referral to ENT     Hyperlipidemia/encounter for weight loss counseling/class II severe obesity: Patient will be continued on phentermine and Topamax.  He is doing well with weight loss.  No adverse effects.  OARRS report was reviewed and appropriate.  He will follow-up in 1 month for recheck.   Will continue dietary changes.  Slowly starting to incorporate more physical activity.    Bilateral ear fullness: Suspect some form of eustachian tube dysfunction he will continue intra nasal corticosteroids.  He did not tolerate the antihistamines well due to mucosal dryness.  His symptoms are not improved to see ENT for follow-up.  Currently no evidence of infection.

## 2024-09-04 ENCOUNTER — APPOINTMENT (OUTPATIENT)
Dept: UROLOGY | Facility: CLINIC | Age: 34
End: 2024-09-04
Payer: COMMERCIAL

## 2024-09-04 VITALS — WEIGHT: 264 LBS | TEMPERATURE: 97.6 F | HEIGHT: 72 IN | BODY MASS INDEX: 35.76 KG/M2

## 2024-09-04 DIAGNOSIS — Z13.220 ENCOUNTER FOR LIPID SCREENING FOR CARDIOVASCULAR DISEASE: ICD-10-CM

## 2024-09-04 DIAGNOSIS — N46.9 INFERTILITY MALE: ICD-10-CM

## 2024-09-04 DIAGNOSIS — N46.9 MALE INFERTILITY: ICD-10-CM

## 2024-09-04 DIAGNOSIS — N52.9 ERECTILE DYSFUNCTION, UNSPECIFIED ERECTILE DYSFUNCTION TYPE: ICD-10-CM

## 2024-09-04 DIAGNOSIS — Z13.6 ENCOUNTER FOR LIPID SCREENING FOR CARDIOVASCULAR DISEASE: ICD-10-CM

## 2024-09-04 DIAGNOSIS — Z13.1 SCREENING FOR DIABETES MELLITUS (DM): ICD-10-CM

## 2024-09-04 DIAGNOSIS — Z00.00 HEALTH MAINTENANCE EXAMINATION: ICD-10-CM

## 2024-09-04 DIAGNOSIS — R39.9 LOWER URINARY TRACT SYMPTOMS (LUTS): ICD-10-CM

## 2024-09-04 DIAGNOSIS — N52.8 OTHER MALE ERECTILE DYSFUNCTION: ICD-10-CM

## 2024-09-04 DIAGNOSIS — N46.01 AZOOSPERMIA: Primary | ICD-10-CM

## 2024-09-04 DIAGNOSIS — R79.89 LOW TESTOSTERONE IN MALE: ICD-10-CM

## 2024-09-04 PROCEDURE — G2211 COMPLEX E/M VISIT ADD ON: HCPCS | Performed by: UROLOGY

## 2024-09-04 PROCEDURE — 99204 OFFICE O/P NEW MOD 45 MIN: CPT | Performed by: UROLOGY

## 2024-09-04 PROCEDURE — 1036F TOBACCO NON-USER: CPT | Performed by: UROLOGY

## 2024-09-04 PROCEDURE — 3008F BODY MASS INDEX DOCD: CPT | Performed by: UROLOGY

## 2024-09-04 RX ORDER — GABAPENTIN 300 MG/1
600 CAPSULE ORAL ONCE
OUTPATIENT
Start: 2024-09-04 | End: 2024-09-04

## 2024-09-04 RX ORDER — SODIUM CHLORIDE, SODIUM LACTATE, POTASSIUM CHLORIDE, CALCIUM CHLORIDE 600; 310; 30; 20 MG/100ML; MG/100ML; MG/100ML; MG/100ML
20 INJECTION, SOLUTION INTRAVENOUS CONTINUOUS
OUTPATIENT
Start: 2024-09-04

## 2024-09-04 RX ORDER — CEFAZOLIN SODIUM 2 G/100ML
2 INJECTION, SOLUTION INTRAVENOUS ONCE
OUTPATIENT
Start: 2024-09-04 | End: 2024-09-04

## 2024-09-04 RX ORDER — CELECOXIB 400 MG/1
400 CAPSULE ORAL ONCE
OUTPATIENT
Start: 2024-09-04 | End: 2024-09-04

## 2024-09-04 RX ORDER — ACETAMINOPHEN 325 MG/1
975 TABLET ORAL ONCE
OUTPATIENT
Start: 2024-09-04 | End: 2024-09-04

## 2024-09-04 RX ORDER — TADALAFIL 5 MG/1
5 TABLET ORAL DAILY
Qty: 30 TABLET | Refills: 11 | Status: SHIPPED | OUTPATIENT
Start: 2024-09-04

## 2024-09-04 ASSESSMENT — PAIN SCALES - GENERAL: PAINLEVEL: 0-NO PAIN

## 2024-09-04 NOTE — LETTER
September 4, 2024     Sal Luis DO  [id number]  [facility] [address]    Patient: Wisam Corley   YOB: 1990   Date of Visit: 9/4/2024       Dear Dr. Sal Luis, :    Thank you for referring Wisam Corley to me for evaluation. Below are my notes for this consultation.  If you have questions, please do not hesitate to call me. I look forward to following your patient along with you.       Sincerely,     Porsha Cook MD      CC: No Recipients  ______________________________________________________________________________________    HPI:  33 y.o. male  referred to me by  Dr. Garcia for infertility; wanting second opinion. Previously in relationship, still wants info on fertility testing.    Reports weight loss    Attempting Pregnancy for :  13 years, never used condom, never had pregnancy, 1 miscarriage  Previous pregnancies for either partner: Previous GF had 1 miscarriage    Previous Semen analysis / Labs: 2 SA (1 home test, 1 at [facility])    SA (2/21/2024) personally reviewed and interpreted from [facility]: Showed normal value azospermia on pellet  Not seeing doctor at [facility]    PREVIOUS RISK FACTORS  History of testicular exposure to chemicals, radiation, or toxins: None  History of high fever, epididymitis, orchitis, prostatitis, sexually transmitted diseases, or trauma to the testicles: None  History of a varicocele, testicular torsion, cryptorchidism, postpubertile mumps or a family history of infertility: Hernia surgery and undescended testicles that req'd surgery      Problems with erections or ejaculation: None  Smoker: quit in past  Previous Testosterone or anabolic steroid Use: none      PRIOR Assisted Reproductive Technology:  None      #Hypogonadism.  High FSH  Previous use of testosterone: No    These include:  Decreased libido: yes  Decreased energy: yes  Decreased muscle mass: yes   Erectile Dysfunction: yes    Want to preserve fertility: No  Personal history of  gynecomastia and/or concerns about the condition: MNo  Family / Personal History of Prostate Cancer: No  MI or Stroke: No  Component      Latest Ref [facility] 6/18/2024   WBC      4.4 - 11.3 x10*3/uL 11.7 (H)    nRBC      0.0 - 0.0 /100 WBCs 0.0    RBC      4.50 - 5.90 x10*6/uL 4.91    HEMOGLOBIN      13.5 - 17.5 g/dL 14.5    HEMATOCRIT      41.0 - 52.0 % 44.4    MCV      80 - 100 fL 90    MCH      26.0 - 34.0 pg 29.5    MCHC      32.0 - 36.0 g/dL 32.7    RED CELL DISTRIBUTION WIDTH      11.5 - 14.5 % 12.6    Platelets      150 - 450 x10*3/uL 316    Neutrophils %      40.0 - 80.0 % 72.7    Immature Granulocytes %, Automated      0.0 - 0.9 % 0.3    Lymphocytes %      13.0 - 44.0 % 19.4    Monocytes %      2.0 - 10.0 % 5.9    Eosinophils %      0.0 - 6.0 % 1.4    Basophils %      0.0 - 2.0 % 0.3    Neutrophils Absolute      1.20 - 7.70 x10*3/uL 8.48 (H)    Immature Granulocytes Absolute, Automated      0.00 - 0.70 x10*3/uL 0.04    Lymphocytes Absolute      1.20 - 4.80 x10*3/uL 2.26    Monocytes Absolute      0.10 - 1.00 x10*3/uL 0.69    Eosinophils Absolute      0.00 - 0.70 x10*3/uL 0.16    Basophils Absolute      0.00 - 0.10 x10*3/uL 0.04    GLUCOSE      74 - 99 mg/dL 131 (H)    SODIUM      136 - 145 mmol/L 140    POTASSIUM      3.5 - 5.3 mmol/L 4.6    CHLORIDE      98 - 107 mmol/L 103    Bicarbonate      21 - 32 mmol/L 31    Anion Gap      10 - 20 mmol/L 11    Blood Urea Nitrogen      6 - 23 mg/dL 20    Creatinine      0.50 - 1.30 mg/dL 0.95    EGFR      >60 mL/min/1.73m*2 >90    Calcium      8.6 - 10.3 mg/dL 9.5    Albumin      3.4 - 5.0 g/dL 4.5    Alkaline Phosphatase      33 - 120 U/L 37    Total Protein      6.4 - 8.2 g/dL 6.9    AST      9 - 39 U/L 16    Bilirubin Total      0.0 - 1.2 mg/dL 0.5    ALT      10 - 52 U/L 19    CHOLESTEROL      0 - 199 mg/dL 172    HDL CHOLESTEROL      mg/dL 33.8    Cholesterol/HDL Ratio 5.1    LDL Calculated      <=99 mg/dL 99    VLDL      0 - 40 mg/dL 39    TRIGLYCERIDES      0 - 149  mg/dL 194 (H)    Non HDL Cholesterol      0 - 149 mg/dL 138    Testosterone, Free      35.0 - 155.0 pg/mL 26.9 (L)    Testosterone, Total, LC-MS/MS      250 - 1100 ng/dL 135 (L)    Hemoglobin A1C      see below % 5.7 (H)    Estimated Average Glucose      Not Established mg/dL 117    Vitamin D, 25-Hydroxy, Total      30 - 100 ng/mL 46       Legend:  (H) High  (L) Low    PMH:  No past medical history on file.     PSH:  Past Surgical History:   Procedure Laterality Date   • OTHER SURGICAL HISTORY  11/07/2022    Hand surgery   • OTHER SURGICAL HISTORY  11/07/2022    Hernia repair        Medications:    Current Outpatient Medications:   •  albuterol sulfate (Proair Digihaler) 90 mcg/actuation aero powdr breath act w/sensor inhaler, Inhale 1 puff every 4 hours if needed for wheezing., Disp: , Rfl:   •  cyanocobalamin (Vitamin B-12) 1,000 mcg tablet, Take 1 tablet (1,000 mcg) by mouth once daily., Disp: , Rfl:   •  fluticasone (Flonase) 50 mcg/actuation nasal spray, Administer into affected nostril(s) every 12 hours., Disp: , Rfl:   •  loratadine (Claritin) 10 mg tablet, Take 1 tablet (10 mg) by mouth once daily., Disp: , Rfl:   •  multivitamin tablet, Take 1 tablet by mouth once daily., Disp: , Rfl:   •  phentermine (Adipex-P) 37.5 mg tablet, Take 1 tablet (37.5 mg) by mouth once daily in the morning. Take before meals., Disp: 30 tablet, Rfl: 0  •  rosuvastatin (Crestor) 5 mg tablet, Take 1 tablet (5 mg) by mouth once daily., Disp: 90 tablet, Rfl: 1  •  topiramate (Topamax) 25 mg tablet, Take 1 tablet (25 mg) by mouth 2 times a day., Disp: 60 tablet, Rfl: 5    Allergy:  Allergies   Allergen Reactions   • Fish Containing Products Anaphylaxis   • Iodine Anaphylaxis     Pt is allergic to seafood.   • Shellfish Containing Products Unknown and Anaphylaxis   • Shrimp Anaphylaxis        Exam  Testicles 10cc descended bilaterally, nontender  Left scrotal cyst at skin level  Vasa palpable bilaterally  Penis circ'd, no lesions, no  plaques    Assessment/Plan  #infertility/azoospermia, high fsh  I counseled the patient in detail regarding infertility, specifically the male component. We reviewed different causes of male infertility as well as options to optimize male fertility, to different surgical interventions and assisted reproductive technologies.    Semen analysis with strict morphology, to be performed at [facility]  Fertility Panel:   serum Estradiol, FSH + LH, serum Prolactin, Testosterone   I have scheduled a scrotal ultrasound to better evaluate the testicles, epididymis, and spermatic cords.  Karyotype and y-chromosome microdeletion   Microtese handout given - will check insurance re: microTESE.     #Low Testosterone   -discussed r/b/a of treatment  -would not give T now given fertility concerns    #scrotal skin cyst  -would excise at time of mTESE    #ED  -cialis 5mg daily  -med counseling done  -ED panel      Fu for result review    G 7535  Visit complexity inherent to evaluation and management (E&M) associated with medical care services that serve as the continuing focal point for all needed health care services and/or with medical care services that are part of ongoing care related to a patient's single, serious condition or a complex condition.    Scribe Attestation  By signing my name below, I, Miguel Donahue,   attest that this documentation has been prepared under the direction and in the presence of Porsha Cook MD.

## 2024-09-04 NOTE — PROGRESS NOTES
HPI:  33 y.o. male  referred to me by  Dr. Garcia for infertility; wanting second opinion. Previously in relationship, still wants info on fertility testing.    Reports weight loss    Attempting Pregnancy for :  13 years, never used condom, never had pregnancy, 1 miscarriage  Previous pregnancies for either partner: Previous GF had 1 miscarriage    Previous Semen analysis / Labs: 2 SA (1 home test, 1 at [facility])    SA (2/21/2024) personally reviewed and interpreted from [facility]: Showed normal value azospermia on pellet  Not seeing doctor at [facility]    PREVIOUS RISK FACTORS  History of testicular exposure to chemicals, radiation, or toxins: None  History of high fever, epididymitis, orchitis, prostatitis, sexually transmitted diseases, or trauma to the testicles: None  History of a varicocele, testicular torsion, cryptorchidism, postpubertile mumps or a family history of infertility: Hernia surgery and undescended testicles that req'd surgery      Problems with erections or ejaculation: None  Smoker: quit in past  Previous Testosterone or anabolic steroid Use: none      PRIOR Assisted Reproductive Technology:  None      #Hypogonadism.  High FSH  Previous use of testosterone: No    These include:  Decreased libido: yes  Decreased energy: yes  Decreased muscle mass: yes   Erectile Dysfunction: yes    Want to preserve fertility: No  Personal history of gynecomastia and/or concerns about the condition: MNo  Family / Personal History of Prostate Cancer: No  MI or Stroke: No  Component      Latest Ref Rn 6/18/2024   WBC      4.4 - 11.3 x10*3/uL 11.7 (H)    nRBC      0.0 - 0.0 /100 WBCs 0.0    RBC      4.50 - 5.90 x10*6/uL 4.91    HEMOGLOBIN      13.5 - 17.5 g/dL 14.5    HEMATOCRIT      41.0 - 52.0 % 44.4    MCV      80 - 100 fL 90    MCH      26.0 - 34.0 pg 29.5    MCHC      32.0 - 36.0 g/dL 32.7    RED CELL DISTRIBUTION WIDTH      11.5 - 14.5 % 12.6    Platelets      150 - 450 x10*3/uL 316    Neutrophils %      40.0 - 80.0 %  72.7    Immature Granulocytes %, Automated      0.0 - 0.9 % 0.3    Lymphocytes %      13.0 - 44.0 % 19.4    Monocytes %      2.0 - 10.0 % 5.9    Eosinophils %      0.0 - 6.0 % 1.4    Basophils %      0.0 - 2.0 % 0.3    Neutrophils Absolute      1.20 - 7.70 x10*3/uL 8.48 (H)    Immature Granulocytes Absolute, Automated      0.00 - 0.70 x10*3/uL 0.04    Lymphocytes Absolute      1.20 - 4.80 x10*3/uL 2.26    Monocytes Absolute      0.10 - 1.00 x10*3/uL 0.69    Eosinophils Absolute      0.00 - 0.70 x10*3/uL 0.16    Basophils Absolute      0.00 - 0.10 x10*3/uL 0.04    GLUCOSE      74 - 99 mg/dL 131 (H)    SODIUM      136 - 145 mmol/L 140    POTASSIUM      3.5 - 5.3 mmol/L 4.6    CHLORIDE      98 - 107 mmol/L 103    Bicarbonate      21 - 32 mmol/L 31    Anion Gap      10 - 20 mmol/L 11    Blood Urea Nitrogen      6 - 23 mg/dL 20    Creatinine      0.50 - 1.30 mg/dL 0.95    EGFR      >60 mL/min/1.73m*2 >90    Calcium      8.6 - 10.3 mg/dL 9.5    Albumin      3.4 - 5.0 g/dL 4.5    Alkaline Phosphatase      33 - 120 U/L 37    Total Protein      6.4 - 8.2 g/dL 6.9    AST      9 - 39 U/L 16    Bilirubin Total      0.0 - 1.2 mg/dL 0.5    ALT      10 - 52 U/L 19    CHOLESTEROL      0 - 199 mg/dL 172    HDL CHOLESTEROL      mg/dL 33.8    Cholesterol/HDL Ratio 5.1    LDL Calculated      <=99 mg/dL 99    VLDL      0 - 40 mg/dL 39    TRIGLYCERIDES      0 - 149 mg/dL 194 (H)    Non HDL Cholesterol      0 - 149 mg/dL 138    Testosterone, Free      35.0 - 155.0 pg/mL 26.9 (L)    Testosterone, Total, LC-MS/MS      250 - 1100 ng/dL 135 (L)    Hemoglobin A1C      see below % 5.7 (H)    Estimated Average Glucose      Not Established mg/dL 117    Vitamin D, 25-Hydroxy, Total      30 - 100 ng/mL 46       Legend:  (H) High  (L) Low    PMH:  No past medical history on file.     PSH:  Past Surgical History:   Procedure Laterality Date    OTHER SURGICAL HISTORY  11/07/2022    Hand surgery    OTHER SURGICAL HISTORY  11/07/2022    Hernia repair         Medications:    Current Outpatient Medications:     albuterol sulfate (Proair Digihaler) 90 mcg/actuation aero powdr breath act w/sensor inhaler, Inhale 1 puff every 4 hours if needed for wheezing., Disp: , Rfl:     cyanocobalamin (Vitamin B-12) 1,000 mcg tablet, Take 1 tablet (1,000 mcg) by mouth once daily., Disp: , Rfl:     fluticasone (Flonase) 50 mcg/actuation nasal spray, Administer into affected nostril(s) every 12 hours., Disp: , Rfl:     loratadine (Claritin) 10 mg tablet, Take 1 tablet (10 mg) by mouth once daily., Disp: , Rfl:     multivitamin tablet, Take 1 tablet by mouth once daily., Disp: , Rfl:     phentermine (Adipex-P) 37.5 mg tablet, Take 1 tablet (37.5 mg) by mouth once daily in the morning. Take before meals., Disp: 30 tablet, Rfl: 0    rosuvastatin (Crestor) 5 mg tablet, Take 1 tablet (5 mg) by mouth once daily., Disp: 90 tablet, Rfl: 1    topiramate (Topamax) 25 mg tablet, Take 1 tablet (25 mg) by mouth 2 times a day., Disp: 60 tablet, Rfl: 5    Allergy:  Allergies   Allergen Reactions    Fish Containing Products Anaphylaxis    Iodine Anaphylaxis     Pt is allergic to seafood.    Shellfish Containing Products Unknown and Anaphylaxis    Shrimp Anaphylaxis        Exam  Testicles 10cc descended bilaterally, nontender  Left scrotal cyst at skin level  Vasa palpable bilaterally  Penis circ'd, no lesions, no plaques    Assessment/Plan  #infertility/azoospermia, high fsh  I counseled the patient in detail regarding infertility, specifically the male component. We reviewed different causes of male infertility as well as options to optimize male fertility, to different surgical interventions and assisted reproductive technologies.    Semen analysis with strict morphology, to be performed at [facility]  Fertility Panel:   serum Estradiol, FSH + LH, serum Prolactin, Testosterone   I have scheduled a scrotal ultrasound to better evaluate the testicles, epididymis, and spermatic cords.  Karyotype and  y-chromosome microdeletion   Microtese handout given - will check insurance re: microTESE.     #Low Testosterone   -discussed r/b/a of treatment  -would not give T now given fertility concerns    #scrotal skin cyst  -would excise at time of mTESE    #ED  -cialis 5mg daily  -med counseling done  -ED panel      Fu for result review    G 2211  Visit complexity inherent to evaluation and management (E&M) associated with medical care services that serve as the continuing focal point for all needed health care services and/or with medical care services that are part of ongoing care related to a patient's single, serious condition or a complex condition.    Scribe Attestation  By signing my name below, I, Miguel Donahue,   attest that this documentation has been prepared under the direction and in the presence of Porsha Cook MD.

## 2024-09-13 ENCOUNTER — LAB (OUTPATIENT)
Dept: LAB | Facility: LAB | Age: 34
End: 2024-09-13
Payer: COMMERCIAL

## 2024-09-13 DIAGNOSIS — Z13.1 SCREENING FOR DIABETES MELLITUS (DM): ICD-10-CM

## 2024-09-13 DIAGNOSIS — N46.9 MALE INFERTILITY: ICD-10-CM

## 2024-09-13 DIAGNOSIS — N46.9 INFERTILITY MALE: ICD-10-CM

## 2024-09-13 DIAGNOSIS — R79.89 LOW TESTOSTERONE IN MALE: ICD-10-CM

## 2024-09-13 DIAGNOSIS — N46.01 AZOOSPERMIA: ICD-10-CM

## 2024-09-13 DIAGNOSIS — Z13.220 ENCOUNTER FOR LIPID SCREENING FOR CARDIOVASCULAR DISEASE: ICD-10-CM

## 2024-09-13 DIAGNOSIS — N52.9 ERECTILE DYSFUNCTION, UNSPECIFIED ERECTILE DYSFUNCTION TYPE: ICD-10-CM

## 2024-09-13 DIAGNOSIS — Z13.6 ENCOUNTER FOR LIPID SCREENING FOR CARDIOVASCULAR DISEASE: ICD-10-CM

## 2024-09-13 DIAGNOSIS — Z00.00 HEALTH MAINTENANCE EXAMINATION: ICD-10-CM

## 2024-09-13 LAB
CHOLEST SERPL-MCNC: 124 MG/DL (ref 0–199)
CHOLESTEROL/HDL RATIO: 3.6
CREAT SERPL-MCNC: 0.97 MG/DL (ref 0.5–1.3)
EGFRCR SERPLBLD CKD-EPI 2021: >90 ML/MIN/1.73M*2
EST. AVERAGE GLUCOSE BLD GHB EST-MCNC: 108 MG/DL
ESTRADIOL SERPL-MCNC: 27 PG/ML
FSH SERPL-ACNC: 27.2 IU/L
HBA1C MFR BLD: 5.4 %
HCT VFR BLD AUTO: 43.1 % (ref 41–52)
HDLC SERPL-MCNC: 34.3 MG/DL
LDLC SERPL CALC-MCNC: 74 MG/DL
LH SERPL-ACNC: 19.5 IU/L
NON HDL CHOLESTEROL: 90 MG/DL (ref 0–149)
PROLACTIN SERPL-MCNC: 8.7 UG/L (ref 2–18)
TESTOST SERPL-MCNC: 204 NG/DL (ref 240–1000)
TRIGL SERPL-MCNC: 81 MG/DL (ref 0–149)
VLDL: 16 MG/DL (ref 0–40)

## 2024-09-13 PROCEDURE — 83036 HEMOGLOBIN GLYCOSYLATED A1C: CPT

## 2024-09-13 PROCEDURE — 80061 LIPID PANEL: CPT

## 2024-09-13 PROCEDURE — 84403 ASSAY OF TOTAL TESTOSTERONE: CPT

## 2024-09-13 PROCEDURE — 83002 ASSAY OF GONADOTROPIN (LH): CPT

## 2024-09-13 PROCEDURE — 82670 ASSAY OF TOTAL ESTRADIOL: CPT

## 2024-09-13 PROCEDURE — 85014 HEMATOCRIT: CPT

## 2024-09-13 PROCEDURE — 84146 ASSAY OF PROLACTIN: CPT

## 2024-09-13 PROCEDURE — 36415 COLL VENOUS BLD VENIPUNCTURE: CPT

## 2024-09-13 PROCEDURE — 81403 MOPATH PROCEDURE LEVEL 4: CPT

## 2024-09-13 PROCEDURE — 83001 ASSAY OF GONADOTROPIN (FSH): CPT

## 2024-09-13 PROCEDURE — 82565 ASSAY OF CREATININE: CPT

## 2024-09-19 ENCOUNTER — APPOINTMENT (OUTPATIENT)
Dept: PRIMARY CARE | Facility: CLINIC | Age: 34
End: 2024-09-19
Payer: COMMERCIAL

## 2024-09-19 VITALS
HEIGHT: 72 IN | WEIGHT: 235 LBS | OXYGEN SATURATION: 96 % | SYSTOLIC BLOOD PRESSURE: 110 MMHG | HEART RATE: 100 BPM | BODY MASS INDEX: 31.83 KG/M2 | DIASTOLIC BLOOD PRESSURE: 76 MMHG

## 2024-09-19 DIAGNOSIS — Z71.3 ENCOUNTER FOR WEIGHT LOSS COUNSELING: ICD-10-CM

## 2024-09-19 DIAGNOSIS — S83.421A SPRAIN OF LATERAL COLLATERAL LIGAMENT OF RIGHT KNEE, INITIAL ENCOUNTER: ICD-10-CM

## 2024-09-19 DIAGNOSIS — M25.561 ACUTE PAIN OF RIGHT KNEE: Primary | ICD-10-CM

## 2024-09-19 DIAGNOSIS — E66.01 CLASS 2 SEVERE OBESITY DUE TO EXCESS CALORIES WITH SERIOUS COMORBIDITY AND BODY MASS INDEX (BMI) OF 35.0 TO 35.9 IN ADULT: ICD-10-CM

## 2024-09-19 DIAGNOSIS — Z23 ENCOUNTER FOR IMMUNIZATION: ICD-10-CM

## 2024-09-19 DIAGNOSIS — E78.2 MIXED HYPERLIPIDEMIA: ICD-10-CM

## 2024-09-19 PROBLEM — E66.812 CLASS 2 SEVERE OBESITY DUE TO EXCESS CALORIES WITH SERIOUS COMORBIDITY AND BODY MASS INDEX (BMI) OF 35.0 TO 35.9 IN ADULT: Status: ACTIVE | Noted: 2024-09-19

## 2024-09-19 PROCEDURE — 90471 IMMUNIZATION ADMIN: CPT | Performed by: INTERNAL MEDICINE

## 2024-09-19 PROCEDURE — 1036F TOBACCO NON-USER: CPT | Performed by: INTERNAL MEDICINE

## 2024-09-19 PROCEDURE — 99213 OFFICE O/P EST LOW 20 MIN: CPT | Performed by: INTERNAL MEDICINE

## 2024-09-19 PROCEDURE — 90656 IIV3 VACC NO PRSV 0.5 ML IM: CPT | Performed by: INTERNAL MEDICINE

## 2024-09-19 PROCEDURE — 3008F BODY MASS INDEX DOCD: CPT | Performed by: INTERNAL MEDICINE

## 2024-09-19 RX ORDER — PHENTERMINE HYDROCHLORIDE 37.5 MG/1
37.5 TABLET ORAL
Qty: 30 TABLET | Refills: 0 | Status: SHIPPED | OUTPATIENT
Start: 2024-09-19 | End: 2024-10-19

## 2024-09-19 RX ORDER — NAPROXEN 500 MG/1
500 TABLET ORAL 2 TIMES DAILY PRN
Qty: 28 TABLET | Refills: 0 | Status: SHIPPED | OUTPATIENT
Start: 2024-09-19 | End: 2024-10-03

## 2024-09-19 ASSESSMENT — ENCOUNTER SYMPTOMS
NAUSEA: 0
DIZZINESS: 0
AGITATION: 0
ABDOMINAL PAIN: 0
CONSTIPATION: 0
DYSURIA: 0
FEVER: 0
VOMITING: 0
BLOOD IN STOOL: 0
PALPITATIONS: 0
DIARRHEA: 0
CHILLS: 0
SORE THROAT: 0
LIGHT-HEADEDNESS: 0
COUGH: 0
SHORTNESS OF BREATH: 0

## 2024-09-19 ASSESSMENT — LIFESTYLE VARIABLES
AUDIT-C TOTAL SCORE: 0
AUDIT TOTAL SCORE: 0
HOW MANY STANDARD DRINKS CONTAINING ALCOHOL DO YOU HAVE ON A TYPICAL DAY: PATIENT DOES NOT DRINK
HAS A RELATIVE, FRIEND, DOCTOR, OR ANOTHER HEALTH PROFESSIONAL EXPRESSED CONCERN ABOUT YOUR DRINKING OR SUGGESTED YOU CUT DOWN: NO
HAVE YOU OR SOMEONE ELSE BEEN INJURED AS A RESULT OF YOUR DRINKING: NO
HOW OFTEN DO YOU HAVE A DRINK CONTAINING ALCOHOL: NEVER
SKIP TO QUESTIONS 9-10: 1
HOW OFTEN DO YOU HAVE SIX OR MORE DRINKS ON ONE OCCASION: NEVER

## 2024-09-19 NOTE — PROGRESS NOTES
Subjective   Patient ID: Wisam Corley is a 33 y.o. male who presents for Follow-up, Weight Check, and Knee Injury (Right ).    HPI Patient is eating more vegetables and eating. Patient is doing exercises at home. Patient is tolerating the medication well.  Patient was 254 lbs at last office visit. Patient has lost 19 lbs in one month. Patient is tolerating the medication without adverse effects. Patient lost the 5% needed to maintain on phentermine.     Patient injured his right knee 2 years ago and recently he stepped wrong or possible knocked it on something. About 2.5-3 weeks ago he thinks he might had injured again.  He was playing one on one basketball. The pain is anterior to the right knee.  Bending hurts. Putting pressure on the knee hurts.     Review of Systems   Constitutional:  Negative for chills and fever.   HENT:  Negative for sore throat.    Eyes:  Negative for visual disturbance.   Respiratory:  Negative for cough and shortness of breath.    Cardiovascular:  Negative for chest pain, palpitations and leg swelling.   Gastrointestinal:  Negative for abdominal pain, blood in stool, constipation, diarrhea, nausea and vomiting.   Genitourinary:  Negative for dysuria.   Skin:  Negative for rash.   Neurological:  Negative for dizziness, syncope and light-headedness.   Psychiatric/Behavioral:  Negative for agitation.        Objective   /76   Pulse 100   Ht 1.829 m (6')   Wt 107 kg (235 lb)   SpO2 96%   BMI 31.87 kg/m²     Physical Exam  Vitals and nursing note reviewed.   Constitutional:       General: He is not in acute distress.     Appearance: Normal appearance. He is obese. He is not ill-appearing, toxic-appearing or diaphoretic.   HENT:      Head: Normocephalic and atraumatic.      Mouth/Throat:      Mouth: Mucous membranes are moist.      Pharynx: Oropharynx is clear. No oropharyngeal exudate.   Eyes:      Pupils: Pupils are equal, round, and reactive to light.   Cardiovascular:      Rate  and Rhythm: Normal rate and regular rhythm.      Heart sounds: Normal heart sounds.   Pulmonary:      Effort: Pulmonary effort is normal. No respiratory distress.      Breath sounds: Normal breath sounds. No wheezing, rhonchi or rales.   Abdominal:      General: Bowel sounds are normal. There is no distension.      Palpations: Abdomen is soft. There is no mass.      Tenderness: There is no abdominal tenderness.   Musculoskeletal:      Cervical back: Neck supple.      Right lower leg: No edema.      Left lower leg: No edema.      Comments: Negative anterior posterior drawer test.  Negative Lachman's test.  Firm endpoints to testing of the varus and valgus stress of the knee.  Mild tenderness of the lateral right knee.  Along the joint line.   Lymphadenopathy:      Cervical: No cervical adenopathy.   Skin:     General: Skin is warm and dry.      Coloration: Skin is not jaundiced.      Findings: No rash.   Neurological:      General: No focal deficit present.      Mental Status: He is alert and oriented to person, place, and time.      Cranial Nerves: No cranial nerve deficit.   Psychiatric:         Mood and Affect: Mood normal.         Behavior: Behavior normal.         Thought Content: Thought content normal.         Judgment: Judgment normal.         Assessment/Plan   Problem List Items Addressed This Visit             ICD-10-CM    Mixed hyperlipidemia E78.2    Relevant Medications    phentermine (Adipex-P) 37.5 mg tablet    Encounter for weight loss counseling Z71.3    Class 2 severe obesity due to excess calories with serious comorbidity and body mass index (BMI) of 35.0 to 35.9 in adult (Multi) E66.01, Z68.35    Relevant Medications    phentermine (Adipex-P) 37.5 mg tablet    Encounter for immunization Z23    Relevant Orders    Flu vaccine, trivalent, preservative free, age 6 months and greater (Fluarix/Fluzone/Flulaval) (Completed)    Acute pain of right knee - Primary M25.561    Relevant Medications    naproxen  (Naprosyn) 500 mg tablet    Other Relevant Orders    XR knee right 1-2 views    Sprain of lateral collateral ligament of right knee S83.421A    Relevant Medications    naproxen (Naprosyn) 500 mg tablet    Other Relevant Orders    XR knee right 1-2 views     Hyperlipidemia/encounter for weight loss counseling/severe obesity: He will remain on phentermine at this time.  Follow-up in 1 month for recheck.  Patient has met 5% weight loss requirement to maintain the medication.  No adverse effects.  Overall feels well.  Mood is well.  Staying well-hydrated labs are reviewed and improved.  Cholesterol well-controlled.  Sugar levels have also improved from the prediabetes range to normal.    Acute pain of right knee: States he is played some basketball 101 was not used to doing this and thinks he might have planted the knee wrong or twisted it while playing basketball.  States he has pressure there when he bears weight on the area and bend the knee.  Physical exam has some tenderness of the lateral knee otherwise ligaments appear intact on exam as discussed in physical exam.  Naproxen was given for 2-week course he does have a brace at home I advised him to wear I think this is a sprain of the lateral collateral ligament.  If not improved will obtain x-ray of the area.    Encounter for immunization: Flu shot will be updated today

## 2024-09-20 LAB
ELECTRONICALLY SIGNED BY: NORMAL
Y CHROM DEL BLD/T: NORMAL

## 2024-10-08 ENCOUNTER — APPOINTMENT (OUTPATIENT)
Dept: UROLOGY | Facility: CLINIC | Age: 34
End: 2024-10-08
Payer: COMMERCIAL

## 2024-10-17 ENCOUNTER — APPOINTMENT (OUTPATIENT)
Dept: PRIMARY CARE | Facility: CLINIC | Age: 34
End: 2024-10-17
Payer: COMMERCIAL

## 2024-10-17 VITALS
TEMPERATURE: 96.5 F | SYSTOLIC BLOOD PRESSURE: 112 MMHG | BODY MASS INDEX: 31.37 KG/M2 | WEIGHT: 231.6 LBS | DIASTOLIC BLOOD PRESSURE: 70 MMHG | HEIGHT: 72 IN | HEART RATE: 104 BPM

## 2024-10-17 DIAGNOSIS — H93.8X3 EAR FULLNESS, BILATERAL: ICD-10-CM

## 2024-10-17 DIAGNOSIS — E66.812 CLASS 2 SEVERE OBESITY DUE TO EXCESS CALORIES WITH SERIOUS COMORBIDITY AND BODY MASS INDEX (BMI) OF 35.0 TO 35.9 IN ADULT: ICD-10-CM

## 2024-10-17 DIAGNOSIS — E66.01 CLASS 2 SEVERE OBESITY DUE TO EXCESS CALORIES WITH SERIOUS COMORBIDITY AND BODY MASS INDEX (BMI) OF 35.0 TO 35.9 IN ADULT: ICD-10-CM

## 2024-10-17 DIAGNOSIS — Z71.3 ENCOUNTER FOR WEIGHT LOSS COUNSELING: Primary | ICD-10-CM

## 2024-10-17 DIAGNOSIS — S83.421A SPRAIN OF LATERAL COLLATERAL LIGAMENT OF RIGHT KNEE, INITIAL ENCOUNTER: ICD-10-CM

## 2024-10-17 DIAGNOSIS — E78.2 MIXED HYPERLIPIDEMIA: ICD-10-CM

## 2024-10-17 DIAGNOSIS — M25.561 ACUTE PAIN OF RIGHT KNEE: ICD-10-CM

## 2024-10-17 PROCEDURE — 1036F TOBACCO NON-USER: CPT | Performed by: INTERNAL MEDICINE

## 2024-10-17 PROCEDURE — 99213 OFFICE O/P EST LOW 20 MIN: CPT | Performed by: INTERNAL MEDICINE

## 2024-10-17 PROCEDURE — 3008F BODY MASS INDEX DOCD: CPT | Performed by: INTERNAL MEDICINE

## 2024-10-17 RX ORDER — PHENTERMINE HYDROCHLORIDE 37.5 MG/1
37.5 TABLET ORAL
Qty: 30 TABLET | Refills: 0 | Status: SHIPPED | OUTPATIENT
Start: 2024-10-17 | End: 2024-11-16

## 2024-10-17 RX ORDER — NAPROXEN 500 MG/1
500 TABLET ORAL 2 TIMES DAILY PRN
Qty: 28 TABLET | Refills: 0 | Status: SHIPPED | OUTPATIENT
Start: 2024-10-17 | End: 2024-10-31

## 2024-10-17 ASSESSMENT — ENCOUNTER SYMPTOMS
LIGHT-HEADEDNESS: 0
SHORTNESS OF BREATH: 0
NAUSEA: 0
ABDOMINAL PAIN: 0
SLEEP DISTURBANCE: 0
CONFUSION: 0
DIZZINESS: 0
VOMITING: 0
FEVER: 0
AGITATION: 0
DIARRHEA: 0
CHILLS: 0
COUGH: 0
PALPITATIONS: 0
SORE THROAT: 0

## 2024-10-17 NOTE — PROGRESS NOTES
Subjective   Patient ID: Wisam Corley is a 33 y.o. male who presents for Follow-up (Phentermine).    HPI Patient's right knee is better but still hurts. He can put pressure on it and bend it. He took naproxen which helps.  Patient was not able to get the x-ray that we ordered.  Would like refills on naproxen.  We discussed him getting his x-ray before his next visit in 1 month.    Patient also here today for weight loss management.  He is currently on phentermine and here for 1 month follow-up.  In terms of weight loss patient has lost 4 lbs since last office visit. He was 235 lbs at last office visit. Patient is tolerating the medication well without adverse effects.  Weight today is 231 lbs. Patient has met 5% weight loss requirement to remain on phentermine.     Also has issues with the right ear popping and crackling.  Would like us to reprint the ENT referral we have provided him with in the past.  He is not taking the loratadine daily.  We discussed taking it and Flonase daily to help with his symptoms until he sees ENT.    Review of Systems   Constitutional:  Negative for chills and fever.   HENT:  Negative for sore throat.    Eyes:  Negative for visual disturbance.   Respiratory:  Negative for cough and shortness of breath.    Cardiovascular:  Negative for chest pain, palpitations and leg swelling.   Gastrointestinal:  Negative for abdominal pain, diarrhea, nausea and vomiting.   Skin:  Negative for rash.   Neurological:  Negative for dizziness, syncope and light-headedness.   Psychiatric/Behavioral:  Negative for agitation, confusion and sleep disturbance.        Objective   /70 (BP Location: Left arm, Patient Position: Sitting, BP Cuff Size: Adult)   Pulse (!) 120   Temp 35.8 °C (96.5 °F)   Ht 1.829 m (6')   Wt 105 kg (231 lb 9.6 oz)   BMI 31.41 kg/m²     Physical Exam  Vitals and nursing note reviewed.   Constitutional:       General: He is not in acute distress.     Appearance: Normal  appearance. He is obese. He is not ill-appearing, toxic-appearing or diaphoretic.   HENT:      Head: Normocephalic and atraumatic.      Right Ear: A middle ear effusion is present.      Left Ear: Tympanic membrane normal.      Mouth/Throat:      Mouth: Mucous membranes are moist.      Pharynx: Oropharynx is clear. No oropharyngeal exudate or posterior oropharyngeal erythema.   Eyes:      Pupils: Pupils are equal, round, and reactive to light.   Cardiovascular:      Rate and Rhythm: Normal rate and regular rhythm.      Heart sounds: Normal heart sounds.   Pulmonary:      Effort: Pulmonary effort is normal. No respiratory distress.      Breath sounds: Normal breath sounds. No wheezing, rhonchi or rales.   Musculoskeletal:      Cervical back: Neck supple.      Right lower leg: No edema.      Left lower leg: No edema.   Lymphadenopathy:      Cervical: No cervical adenopathy.   Skin:     General: Skin is warm and dry.      Coloration: Skin is not pale.      Findings: No rash.   Neurological:      General: No focal deficit present.      Mental Status: He is alert and oriented to person, place, and time.   Psychiatric:         Mood and Affect: Mood normal.         Behavior: Behavior normal.         Thought Content: Thought content normal.         Judgment: Judgment normal.         Assessment/Plan   Problem List Items Addressed This Visit             ICD-10-CM    Mixed hyperlipidemia E78.2    Relevant Medications    phentermine (Adipex-P) 37.5 mg tablet    Encounter for weight loss counseling - Primary Z71.3    Ear fullness, bilateral H93.8X3    Class 2 severe obesity due to excess calories with serious comorbidity and body mass index (BMI) of 35.0 to 35.9 in adult E66.812, E66.01, Z68.35    Relevant Medications    phentermine (Adipex-P) 37.5 mg tablet    Acute pain of right knee M25.561    Relevant Medications    naproxen (Naprosyn) 500 mg tablet    Sprain of lateral collateral ligament of right knee S83.421A    Relevant  Medications    naproxen (Naprosyn) 500 mg tablet     Counter for weight loss counseling/obesity/hyperlipidemia/prediabetes: Patient is lost 5 pounds since last office visit.  Tolerating medication well with phentermine.  OARRS report is reviewed and appropriate.  No adverse effects from the medication.  Will follow-up in 1 month for recheck.    Acute pain of right knee/right knee sprain: Refills provided for naproxen.  He will get x-rays prior to his next office visit.  If symptoms are not improved we will consider referral to sports medicine or Ortho specialist.    Bilateral ear fullness: Does have evidence of middle ear effusion on the right have recommended loratadine be taken on a daily basis and Flonase.  If symptoms are not improved then ENT evaluation.

## 2024-10-30 ENCOUNTER — HOSPITAL ENCOUNTER (OUTPATIENT)
Dept: RADIOLOGY | Facility: CLINIC | Age: 34
Discharge: HOME | End: 2024-10-30
Payer: COMMERCIAL

## 2024-10-30 DIAGNOSIS — M25.561 ACUTE PAIN OF RIGHT KNEE: ICD-10-CM

## 2024-10-30 DIAGNOSIS — S83.421A SPRAIN OF LATERAL COLLATERAL LIGAMENT OF RIGHT KNEE, INITIAL ENCOUNTER: ICD-10-CM

## 2024-10-30 PROCEDURE — 73560 X-RAY EXAM OF KNEE 1 OR 2: CPT | Mod: RT

## 2024-11-04 DIAGNOSIS — M84.451A SUBCHONDRAL INSUFFICIENCY FRACTURE OF CONDYLE OF RIGHT FEMUR, INITIAL ENCOUNTER: Primary | ICD-10-CM

## 2024-11-04 DIAGNOSIS — M25.561 ACUTE PAIN OF RIGHT KNEE: ICD-10-CM

## 2024-11-06 ENCOUNTER — APPOINTMENT (OUTPATIENT)
Dept: UROLOGY | Facility: CLINIC | Age: 34
End: 2024-11-06
Payer: COMMERCIAL

## 2024-11-13 LAB
BAND RESOLUTION: 550 BANDS
CHROM ANALY OVERALL INTERP-IMP: NORMAL
CHROMOSOME ANALYSIS CELLS ANALYZED: 5 CELLS
CHROMOSOME ANALYSIS CELLS IMAGED: 5 CELLS
CHROMOSOME ANALYSIS HYPERMODAL CELL COUNT: 0 CELLS
CHROMOSOME ANALYSIS HYPOMODAL CELL COUNT: 1 CELLS
CHROMOSOME ANALYSIS MODAL CHROMOSOME NO: 46 CHROMOSOMES
CHROMOSOME ANALYSIS STAINING METHOD: NORMAL
ELECTRONICALLY SIGNED BY CYTOGENETICS: NORMAL
KARYOTYP BLD/T: 3 CELLS
TOTAL CELLS COUNTED BLD: 30 CELLS

## 2024-11-14 ENCOUNTER — APPOINTMENT (OUTPATIENT)
Dept: PRIMARY CARE | Facility: CLINIC | Age: 34
End: 2024-11-14
Payer: COMMERCIAL

## 2024-11-14 VITALS
SYSTOLIC BLOOD PRESSURE: 117 MMHG | WEIGHT: 221.4 LBS | DIASTOLIC BLOOD PRESSURE: 66 MMHG | HEART RATE: 123 BPM | HEIGHT: 72 IN | TEMPERATURE: 97 F | BODY MASS INDEX: 29.99 KG/M2

## 2024-11-14 DIAGNOSIS — E66.01 CLASS 2 SEVERE OBESITY DUE TO EXCESS CALORIES WITH SERIOUS COMORBIDITY AND BODY MASS INDEX (BMI) OF 35.0 TO 35.9 IN ADULT: ICD-10-CM

## 2024-11-14 DIAGNOSIS — E78.2 MIXED HYPERLIPIDEMIA: ICD-10-CM

## 2024-11-14 DIAGNOSIS — M84.451A: Primary | ICD-10-CM

## 2024-11-14 DIAGNOSIS — E66.812 CLASS 2 SEVERE OBESITY DUE TO EXCESS CALORIES WITH SERIOUS COMORBIDITY AND BODY MASS INDEX (BMI) OF 35.0 TO 35.9 IN ADULT: ICD-10-CM

## 2024-11-14 DIAGNOSIS — M25.461 KNEE EFFUSION, RIGHT: ICD-10-CM

## 2024-11-14 DIAGNOSIS — M25.561 ACUTE PAIN OF RIGHT KNEE: ICD-10-CM

## 2024-11-14 DIAGNOSIS — Z71.3 ENCOUNTER FOR WEIGHT LOSS COUNSELING: ICD-10-CM

## 2024-11-14 PROCEDURE — 3008F BODY MASS INDEX DOCD: CPT | Performed by: INTERNAL MEDICINE

## 2024-11-14 PROCEDURE — 99213 OFFICE O/P EST LOW 20 MIN: CPT | Performed by: INTERNAL MEDICINE

## 2024-11-14 RX ORDER — PHENTERMINE HYDROCHLORIDE 37.5 MG/1
37.5 TABLET ORAL
Qty: 30 TABLET | Refills: 0 | Status: SHIPPED | OUTPATIENT
Start: 2024-11-14 | End: 2024-12-12 | Stop reason: SDUPTHER

## 2024-11-14 NOTE — PROGRESS NOTES
Subjective   Patient ID: Wisam Corley is a 33 y.o. male who presents for Follow-up (Phentermine follow up/Follow up on right knee xray).    HPI patient is a 34-year-old male presents to the office today for weight loss management also presents to discuss right knee pain.  Patient tolerating phentermine well without adverse effects.  OARRS report is reviewed and appropriate.  Phentermine last filled on 10/17/2024.  Patient is down 10 pounds since last office visit.  Patient has met 5% weight loss requirement for phentermine.  Would like to stay on the medication at this time.  Patient remains on Topamax as well.  Patient had right knee x-ray on 10/30/2024 to evaluate right knee pain.  Right knee x-ray showed asymmetric increased sclerosis of the lateral femoral condyle which may represent a subchondral insufficiency fracture.  The radiologist recommended considering an MRI.  There is also a small knee joint effusion.  When these results came back we did refer him to an orthopedic specialist.  Patient has not scheduled with a orthopedist yet.  We recommended knee brace I have also ordered MRI of his knee for further evaluation.    Review of Systems   Constitutional:  Negative for chills and fever.   HENT:  Negative for sore throat.    Eyes:  Negative for visual disturbance.   Respiratory:  Negative for cough and shortness of breath.    Cardiovascular:  Negative for chest pain and leg swelling.   Gastrointestinal:  Negative for abdominal pain, nausea and vomiting.   Skin:  Negative for rash.   Neurological:  Negative for dizziness, syncope and light-headedness.   Psychiatric/Behavioral:  Negative for agitation and confusion.        Objective   /66 (BP Location: Left arm, Patient Position: Sitting, BP Cuff Size: Adult)   Pulse (!) 123   Temp 36.1 °C (97 °F)   Ht 1.829 m (6')   Wt 100 kg (221 lb 6.4 oz)   BMI 30.03 kg/m²     Physical Exam  Vitals and nursing note reviewed.   Constitutional:       General: He  is not in acute distress.     Appearance: Normal appearance. He is not ill-appearing, toxic-appearing or diaphoretic.   HENT:      Head: Normocephalic and atraumatic.      Mouth/Throat:      Mouth: Mucous membranes are moist.      Pharynx: Oropharynx is clear.   Eyes:      Pupils: Pupils are equal, round, and reactive to light.   Cardiovascular:      Rate and Rhythm: Normal rate and regular rhythm.      Heart sounds: Normal heart sounds.   Pulmonary:      Effort: Pulmonary effort is normal. No respiratory distress.      Breath sounds: Normal breath sounds. No wheezing, rhonchi or rales.   Abdominal:      General: Bowel sounds are normal. There is no distension.      Palpations: Abdomen is soft.      Tenderness: There is no abdominal tenderness.   Musculoskeletal:         General: Swelling (right knee) and tenderness (right anterior knee) present. No deformity.      Right lower leg: No edema.      Left lower leg: No edema.   Skin:     General: Skin is warm and dry.   Neurological:      General: No focal deficit present.      Mental Status: He is alert and oriented to person, place, and time.      Cranial Nerves: No cranial nerve deficit.   Psychiatric:         Mood and Affect: Mood normal.         Behavior: Behavior normal.         Thought Content: Thought content normal.         Judgment: Judgment normal.         Assessment/Plan   Problem List Items Addressed This Visit             ICD-10-CM    Mixed hyperlipidemia E78.2    Encounter for weight loss counseling Z71.3    Class 2 severe obesity due to excess calories with serious comorbidity and body mass index (BMI) of 35.0 to 35.9 in adult E66.812, E66.01, Z68.35    Acute pain of right knee M25.561    Relevant Orders    MR knee right wo IV contrast (Completed)    Knee effusion, right M25.461    Relevant Orders    MR knee right wo IV contrast (Completed)    Subchondral insufficiency fracture of femoral condyle, right, initial encounter - Primary M84.451A    Relevant  Orders    Knee brace    MR knee right wo IV contrast (Completed)     Acute pain of right knee/right knee effusion/subchondral insufficiency fracture of femoral condyle of the right: We have ordered MRI of the right knee for further evaluation at the recommendation of radiology.  We have also ordered a knee brace for the patient to wear until he sees orthopedics which has also been consulted.  He may take Tylenol for pain.  Rest ice elevation to the area.  Avoid extended weightbearing.    Hyperlipidemia: Will recheck cholesterol levels in the late winter/early spring.  Patient wants to wait till he gets closer to his goal weight before rechecking cholesterol levels.    Encounter for weight loss counseling/obesity/hyperlipidemia: Patient may content on phentermine at this time.  Lost 10 pounds at last office visit.  No adverse effects of the medication.  OARRS report is reviewed and appropriate.

## 2024-12-04 ENCOUNTER — HOSPITAL ENCOUNTER (OUTPATIENT)
Dept: RADIOLOGY | Facility: HOSPITAL | Age: 34
Discharge: HOME | End: 2024-12-04
Payer: COMMERCIAL

## 2024-12-04 DIAGNOSIS — M25.461 KNEE EFFUSION, RIGHT: ICD-10-CM

## 2024-12-04 DIAGNOSIS — M84.451A: ICD-10-CM

## 2024-12-04 DIAGNOSIS — M25.561 ACUTE PAIN OF RIGHT KNEE: ICD-10-CM

## 2024-12-04 PROCEDURE — 73721 MRI JNT OF LWR EXTRE W/O DYE: CPT | Mod: RIGHT SIDE | Performed by: RADIOLOGY

## 2024-12-04 PROCEDURE — 73721 MRI JNT OF LWR EXTRE W/O DYE: CPT | Mod: RT

## 2024-12-05 NOTE — RESULT ENCOUNTER NOTE
Patient's MRI of the knee showed osteonecrosis of the bones that make up the knee joint specifically the distal femur and proximal tibia.  Osteonecrosis is a term for when there can be a disruption in the blood flow to the bone itself and then that causes the bone to be deprived of essential nutrients and oxygen.  We had already placed a referral for the patient to orthopedic surgery.  I am recommending that he schedule an appointment with the orthopedic specialist that we had recommended.  See referral.

## 2024-12-06 ENCOUNTER — TELEPHONE (OUTPATIENT)
Dept: PRIMARY CARE | Facility: CLINIC | Age: 34
End: 2024-12-06
Payer: COMMERCIAL

## 2024-12-06 NOTE — TELEPHONE ENCOUNTER
----- Message from Sal Luis sent at 12/5/2024  9:30 AM EST -----  Patient's MRI of the knee showed osteonecrosis of the bones that make up the knee joint specifically the distal femur and proximal tibia.  Osteonecrosis is a term for when there can be a disruption in the blood flow to the bone itself and then that c  auses the bone to be deprived of essential nutrients and oxygen.  We had already placed a referral for the patient to orthopedic surgery.  I am recommending that he schedule an appointment with the orthopedic specialist that we had recommended.  See   referral.

## 2024-12-11 ENCOUNTER — APPOINTMENT (OUTPATIENT)
Dept: UROLOGY | Facility: CLINIC | Age: 34
End: 2024-12-11
Payer: COMMERCIAL

## 2024-12-12 ENCOUNTER — APPOINTMENT (OUTPATIENT)
Dept: PRIMARY CARE | Facility: CLINIC | Age: 34
End: 2024-12-12
Payer: COMMERCIAL

## 2024-12-12 VITALS
WEIGHT: 208 LBS | HEIGHT: 72 IN | BODY MASS INDEX: 28.17 KG/M2 | DIASTOLIC BLOOD PRESSURE: 69 MMHG | TEMPERATURE: 98.2 F | HEART RATE: 99 BPM | SYSTOLIC BLOOD PRESSURE: 104 MMHG

## 2024-12-12 DIAGNOSIS — Z71.3 ENCOUNTER FOR WEIGHT LOSS COUNSELING: Primary | ICD-10-CM

## 2024-12-12 DIAGNOSIS — E66.01 CLASS 2 SEVERE OBESITY DUE TO EXCESS CALORIES WITH SERIOUS COMORBIDITY AND BODY MASS INDEX (BMI) OF 35.0 TO 35.9 IN ADULT: ICD-10-CM

## 2024-12-12 DIAGNOSIS — E55.9 VITAMIN D DEFICIENCY: ICD-10-CM

## 2024-12-12 DIAGNOSIS — E78.2 MIXED HYPERLIPIDEMIA: ICD-10-CM

## 2024-12-12 DIAGNOSIS — R73.03 PREDIABETES: ICD-10-CM

## 2024-12-12 DIAGNOSIS — M87.851 OTHER OSTEONECROSIS, RIGHT FEMUR (MULTI): ICD-10-CM

## 2024-12-12 DIAGNOSIS — M87.861: ICD-10-CM

## 2024-12-12 DIAGNOSIS — E66.812 CLASS 2 SEVERE OBESITY DUE TO EXCESS CALORIES WITH SERIOUS COMORBIDITY AND BODY MASS INDEX (BMI) OF 35.0 TO 35.9 IN ADULT: ICD-10-CM

## 2024-12-12 PROCEDURE — 3008F BODY MASS INDEX DOCD: CPT | Performed by: INTERNAL MEDICINE

## 2024-12-12 PROCEDURE — 99213 OFFICE O/P EST LOW 20 MIN: CPT | Performed by: INTERNAL MEDICINE

## 2024-12-12 PROCEDURE — 1036F TOBACCO NON-USER: CPT | Performed by: INTERNAL MEDICINE

## 2024-12-12 RX ORDER — ROSUVASTATIN CALCIUM 5 MG/1
5 TABLET, COATED ORAL DAILY
Qty: 30 TABLET | Refills: 0 | Status: SHIPPED | OUTPATIENT
Start: 2024-12-12 | End: 2025-01-11

## 2024-12-12 RX ORDER — PHENTERMINE HYDROCHLORIDE 37.5 MG/1
37.5 TABLET ORAL
Qty: 30 TABLET | Refills: 0 | Status: SHIPPED | OUTPATIENT
Start: 2024-12-12 | End: 2025-01-11

## 2024-12-12 ASSESSMENT — ENCOUNTER SYMPTOMS
BLOOD IN STOOL: 0
LIGHT-HEADEDNESS: 0
AGITATION: 0
SHORTNESS OF BREATH: 0
FEVER: 0
DIZZINESS: 0
DIARRHEA: 0
SORE THROAT: 0
NAUSEA: 0
ARTHRALGIAS: 1
ABDOMINAL PAIN: 0
CHILLS: 0
VOMITING: 0
CONSTIPATION: 0
COUGH: 0
CONFUSION: 0

## 2024-12-12 NOTE — PROGRESS NOTES
Subjective   Patient ID: Wisam Corley is a 34 y.o. male who presents for Follow-up (Weight loss).    HPI Patient has lost 13 lbs since last office visit.  Patient tolerating phentermine well without adverse effects.  Denies any fever, chills, chest pain shortness of breath, nausea or vomiting.  Still having right knee pain had MRI on 12/4/2024 that showed abnormalities of extensive osteonecrosis of the distal femur and proximal tibia and was referred to orthopedics at that time for further evaluation.  Was recommended to wear a knee brace as well but did not pick 1 up yet.  [facility] [place] [facility] were going to saw him 1 over-the-counter versus have him fitted for 1.  Is going to have our staff help him schedule an appoint with orthopedics today.    Review of Systems   Constitutional:  Negative for chills and fever.   HENT:  Negative for sore throat.    Eyes:  Negative for visual disturbance.   Respiratory:  Negative for cough and shortness of breath.    Cardiovascular:  Negative for chest pain and leg swelling.   Gastrointestinal:  Negative for abdominal pain, blood in stool, constipation, diarrhea, nausea and vomiting.   Musculoskeletal:  Positive for arthralgias.   Skin:  Negative for rash.   Neurological:  Negative for dizziness, syncope and light-headedness.   Psychiatric/Behavioral:  Negative for agitation and confusion.        Objective   /69 (BP Location: Left arm, Patient Position: Sitting, BP Cuff Size: Adult)   Pulse 99   Temp 36.8 °C (98.2 °F)   Ht 1.829 m (6')   Wt 94.3 kg (208 lb)   BMI 28.21 kg/m²     Physical Exam  Vitals and nursing note reviewed.   Constitutional:       General: He is not in acute distress.     Appearance: Normal appearance. He is not ill-appearing, toxic-appearing or diaphoretic.   HENT:      Head: Normocephalic and atraumatic.      Mouth/Throat:      Mouth: Mucous membranes are moist.      Pharynx: Oropharynx is clear. No oropharyngeal exudate.    Eyes:      Pupils: Pupils are equal, round, and reactive to light.   Cardiovascular:      Rate and Rhythm: Normal rate and regular rhythm.      Heart sounds: Normal heart sounds.   Pulmonary:      Effort: Pulmonary effort is normal. No respiratory distress.      Breath sounds: Normal breath sounds. No wheezing, rhonchi or rales.   Musculoskeletal:      Right lower leg: No edema.      Left lower leg: No edema.   Lymphadenopathy:      Cervical: No cervical adenopathy.   Skin:     General: Skin is warm and dry.      Coloration: Skin is not jaundiced or pale.      Findings: No rash.   Neurological:      General: No focal deficit present.      Mental Status: He is alert and oriented to person, place, and time.      Cranial Nerves: No cranial nerve deficit.   Psychiatric:         Mood and Affect: Mood normal.         Behavior: Behavior normal.         Thought Content: Thought content normal.         Judgment: Judgment normal.         Assessment/Plan   Problem List Items Addressed This Visit             ICD-10-CM    Vitamin D deficiency E55.9    Mixed hyperlipidemia E78.2    Relevant Medications    phentermine (Adipex-P) 37.5 mg tablet    rosuvastatin (Crestor) 5 mg tablet    Encounter for weight loss counseling - Primary Z71.3    Prediabetes R73.03    Class 2 severe obesity due to excess calories with serious comorbidity and body mass index (BMI) of 35.0 to 35.9 in adult E66.812, E66.01, Z68.35    Relevant Medications    phentermine (Adipex-P) 37.5 mg tablet    Other osteonecrosis, right femur (Multi) M87.851    Other osteonecrosis, right tibia (Multi) M87.861     Counter for weight loss counseling/obesity/prediabetes/hyperlipidemia: We will continue him on rosuvastatin at this time but the goal will be to get the patient off of cholesterol medication since he has made significant lifestyle changes and weight loss.  He is down another 13 pounds since last office visit we will continue phentermine at this time.  No  adverse effects.  OARRS report was reviewed and appropriate.    Vitamin D deficiency: He continue vitamin D over-the-counter and has had sufficient levels of vitamin D since his last labs 5 months ago    Osteonecrosis of the right femur and right tibia: This was seen on MRI and he has referral to orthopedic surgery.  The etiology of this is unclear.  Advised to use a knee brace was given a prescription for this during last office visit.

## 2024-12-16 DIAGNOSIS — E78.2 MIXED HYPERLIPIDEMIA: ICD-10-CM

## 2024-12-16 DIAGNOSIS — E66.813 CLASS 3 SEVERE OBESITY DUE TO EXCESS CALORIES WITH SERIOUS COMORBIDITY AND BODY MASS INDEX (BMI) OF 40.0 TO 44.9 IN ADULT: ICD-10-CM

## 2024-12-16 DIAGNOSIS — E66.01 CLASS 3 SEVERE OBESITY DUE TO EXCESS CALORIES WITH SERIOUS COMORBIDITY AND BODY MASS INDEX (BMI) OF 40.0 TO 44.9 IN ADULT: ICD-10-CM

## 2024-12-16 RX ORDER — TOPIRAMATE 25 MG/1
25 TABLET ORAL 2 TIMES DAILY
Qty: 60 TABLET | Refills: 5 | Status: SHIPPED | OUTPATIENT
Start: 2024-12-16 | End: 2025-06-14

## 2025-01-08 ENCOUNTER — HOSPITAL ENCOUNTER (OUTPATIENT)
Dept: RADIOLOGY | Facility: CLINIC | Age: 35
Discharge: HOME | End: 2025-01-08
Payer: COMMERCIAL

## 2025-01-08 ENCOUNTER — OFFICE VISIT (OUTPATIENT)
Dept: ORTHOPEDIC SURGERY | Facility: CLINIC | Age: 35
End: 2025-01-08
Payer: COMMERCIAL

## 2025-01-08 DIAGNOSIS — M84.451A SUBCHONDRAL INSUFFICIENCY FRACTURE OF CONDYLE OF RIGHT FEMUR, INITIAL ENCOUNTER: ICD-10-CM

## 2025-01-08 DIAGNOSIS — M25.561 ACUTE PAIN OF RIGHT KNEE: ICD-10-CM

## 2025-01-08 DIAGNOSIS — M87.08: Primary | ICD-10-CM

## 2025-01-08 PROCEDURE — L1812 KO ELASTIC W/JOINTS PRE OTS: HCPCS | Performed by: STUDENT IN AN ORGANIZED HEALTH CARE EDUCATION/TRAINING PROGRAM

## 2025-01-08 PROCEDURE — 99214 OFFICE O/P EST MOD 30 MIN: CPT | Performed by: STUDENT IN AN ORGANIZED HEALTH CARE EDUCATION/TRAINING PROGRAM

## 2025-01-08 PROCEDURE — 73560 X-RAY EXAM OF KNEE 1 OR 2: CPT | Mod: RIGHT SIDE | Performed by: STUDENT IN AN ORGANIZED HEALTH CARE EDUCATION/TRAINING PROGRAM

## 2025-01-08 PROCEDURE — 73560 X-RAY EXAM OF KNEE 1 OR 2: CPT | Mod: RT

## 2025-01-08 RX ORDER — NAPROXEN 500 MG/1
500 TABLET ORAL
Qty: 60 TABLET | Refills: 0 | Status: SHIPPED | OUTPATIENT
Start: 2025-01-08

## 2025-01-08 NOTE — PROGRESS NOTES
Chief Complaint   Patient presents with    Right Knee - New Patient Visit     X-rays today       HPI  34-year-old male presents today for evaluation of his right knee.  His longstanding right knee pain for quite some time.  Did have x-rays and MRI collected by Dr. Luis presents today for discussion of these results.  Sometimes the pain will be quite moderate in nature other times minimal.  Enjoys boxing and being physically active.  Pain is diffusely about the knee endorses occasional mechanical type symptoms.    No past medical history on file.    Past Surgical History:   Procedure Laterality Date    OTHER SURGICAL HISTORY  11/07/2022    Hand surgery    OTHER SURGICAL HISTORY  11/07/2022    Hernia repair        Allergies   Allergen Reactions    Fish Containing Products Anaphylaxis    Iodine Anaphylaxis     Pt is allergic to seafood.    Shellfish Containing Products Unknown and Anaphylaxis    Shrimp Anaphylaxis        Physical exam    General: Alert and oriented to place, person, and time.  No acute distress and breathing comfortably; pleasant and cooperative with the examination.  HEENT: Head is normocephalic and atraumatic.  Neck: Supple, no visible swelling.  Cardiovascular: Good perfusion to the affected extremity.  Lungs: No audible wheezing or labored breathing.  Abdomen: Nondistended  HEME/Lymph : No visible abnormalities bilateral lower extremity    Extremity:  Right knee:  Skin healthy and intact  No gross swelling or ecchymosis  No significant varus or valgus malalignment  Effusion: Mild     ROM:  Full flexion   Full extension  No pain with internal rotation of the hip  Tenderness to palpation: Medial and lateral joint lines proximal tibia, distal femur     No laxity to valgus stress  No laxity to varus stress  Negative Lachman´s test  Negative anterior drawer test  Negative posterior drawer test  Positive Shravan´s test     Neurovascular exam normal distally    Diagnostics:  XR knee right 1-2  views    Result Date: 1/8/2025  Interpreted By:  Vick Neri III, STUDY: XR KNEE RIGHT 1-2 VIEWS; ;  1/8/2025 1:58 pm   INDICATION: Signs/Symptoms:pain.   ,M84.451A Pathological fracture, right femur, initial encounter for fracture,M25.561 Pain in right knee   COMPARISON: None.   ACCESSION NUMBER(S): [id number]   ORDERING CLINICIAN: VICK NERI   FINDINGS: 2 weight-bearing views right knee: Mild joint space narrowing about the medial compartment of the knee, no acute osseous abnormality no fracture or dislocation appreciated       No acute osseous abnormality     MACRO: None   Signed by: Vick Neri III 1/8/2025 2:23 PM Dictation workstation:   [id number]       ProcedurMRI of the  right knee without IV contrast;  12/4/2024 5:50 pm      INDICATION:  Signs/Symptoms:right knee pain, Subchondral insufficiency fracture of  femoral condyle, right.      ,M84.451A Pathological fracture, right femur, initial encounter for  fracture,M25.561 Pain in right knee,M25.461 Effusion, right knee      COMPARISON:  None.      ACCESSION NUMBER(S):  [id number]      ORDERING CLINICIAN:  NEHAL MCGEE      TECHNIQUE:  MR imaging of the right knee was obtained  without IV contrast.      FINDINGS:  LIGAMENTS AND TENDONS:  The anterior cruciate ligament and the posterior cruciate ligaments  are intact. The medial collateral ligament is intact. The lateral  collateral ligament, the biceps femoris tendon, the popliteus tendon  and the iliotibial band are intact. The quadriceps tendon and the  patellar tendon are intact.      MENISCI:  The medial meniscus is intact and without evidence of tear.  The lateral meniscus is intact and without evidence of tear.      JOINTS:  The articular cartilage of the medial femoral condyle and medial  tibial plateau is intact and without evidence of full thickness  defect. The articular cartilage of the lateral femoral condyle and  lateral tibial plateau is intact and without evidence of  full  thickness defect. The patellofemoral articulation is intact and  without evidence of subluxation or articular cartilage defect. No  evidence of significant joint effusion or popliteal cyst.      OSSEOUS STRUCTURES:  There is extensive abnormal marrow signal with serpiginous borders  and low T2 and high T1 central signal throughout the distal femur at  the proximal tibia consistent with osteonecrosis. There is no  evidence of collapse.      SOFT TISSUES:  There is no muscle atrophy or tear.  The common peroneal nerve is intact.      IMPRESSION:  Extensive osteonecrosis in the distal femur and proximal tibia  without collapse No meniscal or ligamentous tear seen      I personally reviewed the images/study and I agree with the findings  as stated. This study was interpreted at [place], [place].      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  MRI findings discussed with patient in detail discussed findings of extensive osteonecrosis about the femur and tibia.  Discussed risk factors for this which patient denies any history of steroid use, alcohol use, sickle cell etc. etc.  Discussed that extensive findings are doing about the femur as well as about the tibia.  There is a high likelihood for likely progressive worsening over time and need for a total knee arthroplasty.  Given that the patient is only 34 years old recommend against this.  Also discussed that proceeding with cortisone injections can actually make this progress even quicker therefore recommend against this at this point in time.  Discussed activity  modifications and lifestyle modifications that are required to help fully prevent progression recommend low impact exercises and activities.  Weight-bear as tolerated using pain as a guide  Given his pain is mild today we will continue with expectant management.  Discussed activities to avoid.  All of the patient's questions were answered.  Will provide a prescription for naproxen  Will also provide a simple hinged brace to provide some support    I spent 35 minutes in the visit which includes: Face-to-face and non-face-to-face time working for this specific patient.  All time was on the date of service.  Total time in activities performed include the following: Preparing to see the patient(e.g., review of test, previous progress notes of mine or another provider; obtaining and/or reviewing separately obtained history; performing a medically appropriate examination and/or counseling and educating the patient/family/caregiver; ordering medications, tests or procedures; referring and communicating with other healthcare professionals (not separately reported); documenting clinical information in the electronic and/or health record; independently interpreting results (not separately reported) and communicating results to the patient family caregiver care coordination (not separately reported).

## 2025-01-13 ENCOUNTER — APPOINTMENT (OUTPATIENT)
Dept: PRIMARY CARE | Facility: CLINIC | Age: 35
End: 2025-01-13
Payer: COMMERCIAL

## 2025-01-13 VITALS
DIASTOLIC BLOOD PRESSURE: 62 MMHG | BODY MASS INDEX: 27.04 KG/M2 | WEIGHT: 199.6 LBS | HEART RATE: 103 BPM | SYSTOLIC BLOOD PRESSURE: 110 MMHG | HEIGHT: 72 IN | TEMPERATURE: 98.2 F

## 2025-01-13 DIAGNOSIS — R73.03 PREDIABETES: ICD-10-CM

## 2025-01-13 DIAGNOSIS — E66.01 CLASS 2 SEVERE OBESITY DUE TO EXCESS CALORIES WITH SERIOUS COMORBIDITY AND BODY MASS INDEX (BMI) OF 35.0 TO 35.9 IN ADULT: ICD-10-CM

## 2025-01-13 DIAGNOSIS — E78.2 MIXED HYPERLIPIDEMIA: ICD-10-CM

## 2025-01-13 DIAGNOSIS — E66.812 CLASS 2 SEVERE OBESITY DUE TO EXCESS CALORIES WITH SERIOUS COMORBIDITY AND BODY MASS INDEX (BMI) OF 35.0 TO 35.9 IN ADULT: ICD-10-CM

## 2025-01-13 DIAGNOSIS — Z71.3 ENCOUNTER FOR WEIGHT LOSS COUNSELING: Primary | ICD-10-CM

## 2025-01-13 DIAGNOSIS — E55.9 VITAMIN D DEFICIENCY: ICD-10-CM

## 2025-01-13 PROCEDURE — 3008F BODY MASS INDEX DOCD: CPT | Performed by: INTERNAL MEDICINE

## 2025-01-13 PROCEDURE — 99213 OFFICE O/P EST LOW 20 MIN: CPT | Performed by: INTERNAL MEDICINE

## 2025-01-13 PROCEDURE — 1036F TOBACCO NON-USER: CPT | Performed by: INTERNAL MEDICINE

## 2025-01-13 RX ORDER — PHENTERMINE HYDROCHLORIDE 37.5 MG/1
37.5 TABLET ORAL
Qty: 30 TABLET | Refills: 0 | Status: SHIPPED | OUTPATIENT
Start: 2025-01-13 | End: 2025-02-12

## 2025-01-13 ASSESSMENT — ENCOUNTER SYMPTOMS
COUGH: 0
VOMITING: 0
FEVER: 0
CONFUSION: 0
LIGHT-HEADEDNESS: 0
SORE THROAT: 0
AGITATION: 0
SHORTNESS OF BREATH: 0
DIZZINESS: 0
CHILLS: 0
ABDOMINAL PAIN: 0
NAUSEA: 0

## 2025-01-13 NOTE — PROGRESS NOTES
Subjective   Patient ID: Wisam Corley is a 34 y.o. male who presents for Follow-up (On phentermine/Follow up on Ortho appt.).    HPI Patient saw orthopedics specialist for his right knee pain, avascular necrosis of right patella, subchondral insufficiency fracture of condyle of right femur. They advised against cortisone injections or surgery but did provide a knee brace and recommended expectant management.     Patient presents for weight loss.  Patient is down 9 lbs since last office visit. Tolerating the medication phentermine well.  Patient will follow-up in 1 month for recheck.  OARRS report is reviewed and appropriate.  We have discussed remaining on topiramate after he gets below the BMI of 27.  After his BMI is below 27 we will need to discontinue phentermine.  Patient aware of this plan.  Would then like to recheck his cholesterol levels after he has gotten down closer to his goal weight.    Review of Systems   Constitutional:  Negative for chills and fever.   HENT:  Negative for sore throat.    Eyes:  Negative for visual disturbance.   Respiratory:  Negative for cough and shortness of breath.    Cardiovascular:  Negative for chest pain and leg swelling.   Gastrointestinal:  Negative for abdominal pain, nausea and vomiting.   Skin:  Negative for rash.   Neurological:  Negative for dizziness, syncope and light-headedness.   Psychiatric/Behavioral:  Negative for agitation and confusion.        Objective   /62 (BP Location: Right arm, Patient Position: Sitting, BP Cuff Size: Adult)   Pulse 103   Temp 36.8 °C (98.2 °F)   Ht 1.829 m (6')   Wt 90.5 kg (199 lb 9.6 oz)   BMI 27.07 kg/m²     Physical Exam  Vitals and nursing note reviewed.   Constitutional:       General: He is not in acute distress.     Appearance: Normal appearance. He is not ill-appearing, toxic-appearing or diaphoretic.   HENT:      Head: Normocephalic and atraumatic.      Mouth/Throat:      Mouth: Mucous membranes are moist.       Pharynx: Oropharynx is clear. No oropharyngeal exudate.   Cardiovascular:      Rate and Rhythm: Normal rate and regular rhythm.      Heart sounds: Normal heart sounds.   Pulmonary:      Effort: Pulmonary effort is normal. No respiratory distress.      Breath sounds: Normal breath sounds. No wheezing, rhonchi or rales.   Abdominal:      General: Bowel sounds are normal. There is no distension.      Palpations: Abdomen is soft.      Tenderness: There is no abdominal tenderness.   Musculoskeletal:      Right lower leg: No edema.      Left lower leg: No edema.   Skin:     General: Skin is warm and dry.      Coloration: Skin is not jaundiced or pale.      Findings: No bruising, lesion or rash.   Neurological:      General: No focal deficit present.      Mental Status: He is alert and oriented to person, place, and time.      Cranial Nerves: No cranial nerve deficit.   Psychiatric:         Mood and Affect: Mood normal.         Behavior: Behavior normal.         Thought Content: Thought content normal.         Judgment: Judgment normal.         Assessment/Plan   Problem List Items Addressed This Visit             ICD-10-CM    Vitamin D deficiency E55.9    Mixed hyperlipidemia E78.2    Relevant Medications    phentermine (Adipex-P) 37.5 mg tablet    Encounter for weight loss counseling - Primary Z71.3    Prediabetes R73.03    Class 2 severe obesity due to excess calories with serious comorbidity and body mass index (BMI) of 35.0 to 35.9 in adult E66.812, E66.01, Z68.35    Relevant Medications    phentermine (Adipex-P) 37.5 mg tablet     encounter for weight loss counseling/obesity/prediabetes/hyperlipidemia: Patient doing well with weight loss is lost 9 pounds since his last office visit.  No adverse effects from the phentermine.  Patient would like to remain on the medication at this time.  BMI is currently 27.07.  Patient will likely meet his goal with phentermine and being below the 27 BMI threshold to be on the  medication.  Will likely need to withdraw the medication after he is below 27 BMI next month and continue Topamax.  We would then plan to repeat blood work to reassess his cholesterol and sugar levels since he has been losing weight.

## 2025-02-13 ENCOUNTER — APPOINTMENT (OUTPATIENT)
Dept: PRIMARY CARE | Facility: CLINIC | Age: 35
End: 2025-02-13
Payer: COMMERCIAL

## 2025-03-05 ENCOUNTER — APPOINTMENT (OUTPATIENT)
Dept: PRIMARY CARE | Facility: CLINIC | Age: 35
End: 2025-03-05
Payer: COMMERCIAL

## 2025-03-05 VITALS
HEART RATE: 82 BPM | SYSTOLIC BLOOD PRESSURE: 112 MMHG | WEIGHT: 192.6 LBS | HEIGHT: 72 IN | TEMPERATURE: 97.5 F | DIASTOLIC BLOOD PRESSURE: 67 MMHG | BODY MASS INDEX: 26.09 KG/M2

## 2025-03-05 DIAGNOSIS — R73.03 PREDIABETES: ICD-10-CM

## 2025-03-05 DIAGNOSIS — E66.3 OVERWEIGHT WITH BODY MASS INDEX (BMI) OF 26 TO 26.9 IN ADULT: ICD-10-CM

## 2025-03-05 DIAGNOSIS — D72.829 LEUKOCYTOSIS, UNSPECIFIED TYPE: ICD-10-CM

## 2025-03-05 DIAGNOSIS — Z13.29 THYROID DISORDER SCREENING: ICD-10-CM

## 2025-03-05 DIAGNOSIS — L65.9 LOSS OF HAIR: ICD-10-CM

## 2025-03-05 DIAGNOSIS — E55.9 VITAMIN D DEFICIENCY: ICD-10-CM

## 2025-03-05 DIAGNOSIS — E78.2 MIXED HYPERLIPIDEMIA: Primary | ICD-10-CM

## 2025-03-05 DIAGNOSIS — Z71.3 ENCOUNTER FOR WEIGHT LOSS COUNSELING: ICD-10-CM

## 2025-03-05 PROCEDURE — 99213 OFFICE O/P EST LOW 20 MIN: CPT | Performed by: INTERNAL MEDICINE

## 2025-03-05 PROCEDURE — 3008F BODY MASS INDEX DOCD: CPT | Performed by: INTERNAL MEDICINE

## 2025-03-05 PROCEDURE — 1036F TOBACCO NON-USER: CPT | Performed by: INTERNAL MEDICINE

## 2025-03-05 ASSESSMENT — ENCOUNTER SYMPTOMS
AGITATION: 0
BLOOD IN STOOL: 0
DYSURIA: 0
NAUSEA: 0
SHORTNESS OF BREATH: 0
SORE THROAT: 0
CONFUSION: 0
FATIGUE: 0
CHILLS: 0
LIGHT-HEADEDNESS: 0
VOMITING: 0
DIARRHEA: 0
ABDOMINAL PAIN: 0
CONSTIPATION: 0
FEVER: 0
DIZZINESS: 0
COUGH: 1
PALPITATIONS: 0

## 2025-03-05 NOTE — PROGRESS NOTES
Subjective   Patient ID: Wisam Corley is a 34 y.o. male who presents for Follow-up (Weight management).    HPI patient following up in the office today for weight loss management.  Patient continues to lose weight.  He is down 7 points since his visit in January 13, 2025. He has been off phentermine for almost a month.  His BMI is now 26.12.  States overall he feels better.  Has lost over 100 lbs since last year.  States he has noticed some hair loss and he thinks it is from the weight loss.  He started taking a hair supplement over-the-counter multivitamin.  Denies any fever, chills, chest pain shortness of breath, nausea, vomiting diarrhea, abdominal pain.  Does have a history of asthma states he will have a cough in the morning has albuterol but has not been using it.  He has not been taking rosuvastatin for over 3 months would like to have his cholesterol rechecked at this time to see what his levels are also has history of prediabetes would like to have his sugars checked.  Also has history of vitamin D deficiency.  He is taking Topamax just at bedtime and more intermittently.  States he is going to try to take it more consistently since he is now off the phentermine.    Review of Systems   Constitutional:  Negative for chills, fatigue and fever.   HENT:  Negative for sore throat.    Eyes:  Negative for visual disturbance.   Respiratory:  Positive for cough. Negative for shortness of breath.    Cardiovascular:  Negative for chest pain, palpitations and leg swelling.   Gastrointestinal:  Negative for abdominal pain, blood in stool, constipation, diarrhea, nausea and vomiting.   Genitourinary:  Negative for dysuria.   Skin:  Negative for rash.   Neurological:  Negative for dizziness, syncope and light-headedness.   Psychiatric/Behavioral:  Negative for agitation and confusion.        Objective   /67 (BP Location: Left arm, Patient Position: Sitting, BP Cuff Size: Adult)   Pulse 82   Temp 36.4 °C (97.5  °F)   Ht 1.829 m (6')   Wt 87.4 kg (192 lb 9.6 oz)   BMI 26.12 kg/m²     Physical Exam  Vitals and nursing note reviewed.   Constitutional:       General: He is not in acute distress.     Appearance: Normal appearance. He is not ill-appearing, toxic-appearing or diaphoretic.   HENT:      Head: Normocephalic and atraumatic.      Mouth/Throat:      Mouth: Mucous membranes are moist.      Pharynx: Oropharynx is clear. No oropharyngeal exudate.   Eyes:      Pupils: Pupils are equal, round, and reactive to light.   Cardiovascular:      Rate and Rhythm: Normal rate and regular rhythm.      Heart sounds: Normal heart sounds.   Pulmonary:      Effort: Pulmonary effort is normal. No respiratory distress.      Breath sounds: Normal breath sounds. No wheezing, rhonchi or rales.   Abdominal:      General: Bowel sounds are normal. There is no distension.      Palpations: Abdomen is soft.      Tenderness: There is no abdominal tenderness.   Musculoskeletal:      Cervical back: Neck supple.      Right lower leg: No edema.      Left lower leg: No edema.   Lymphadenopathy:      Cervical: No cervical adenopathy.   Skin:     General: Skin is warm and dry.      Coloration: Skin is not jaundiced or pale.      Findings: No rash.   Neurological:      General: No focal deficit present.      Mental Status: He is alert and oriented to person, place, and time.      Cranial Nerves: No cranial nerve deficit.   Psychiatric:         Mood and Affect: Mood normal.         Behavior: Behavior normal.         Thought Content: Thought content normal.         Judgment: Judgment normal.         Assessment/Plan   Problem List Items Addressed This Visit             ICD-10-CM    Vitamin D deficiency E55.9    Relevant Orders    Vitamin D 25-Hydroxy,Total (for eval of Vitamin D levels)    Mixed hyperlipidemia - Primary E78.2    Relevant Orders    Lipid panel    Encounter for weight loss counseling Z71.3    Prediabetes R73.03    Relevant Orders     Hemoglobin A1C    Thyroid disorder screening Z13.29    Relevant Orders    TSH with reflex to Free T4 if abnormal    Loss of hair L65.9    Relevant Orders    TSH with reflex to Free T4 if abnormal    Leukocytosis D72.829    Relevant Orders    CBC and Auto Differential    Overweight with body mass index (BMI) of 26 to 26.9 in adult E66.3, Z68.26     Mixed hyperlipidemia: We are going to check a lipid panel he was on rosuvastatin in the past has been held for at least 3 months.  I expect his cholesterol to be better controlled overall since he has lost weight    Prediabetes: Will check a hemoglobin A1c    Vitamin D deficiency: Will check a vitamin D level    Thyroid disorder screening/hair loss: Will check a thyroid level    Leukocytosis: Had leukocytosis chronically on a CBC will recheck CBC level    Encounter for weight loss counseling/overweight: He remains on phentermine at this time for greater than a month and continues to lose weight.  Overall he is feeling well.  We discussed having him back in 3 months for a weight check to see how he is doing overall.  He is very close to his goal weight.  His BMI is currently 26.

## 2025-03-06 LAB
25(OH)D3+25(OH)D2 SERPL-MCNC: 72 NG/ML (ref 30–100)
ALBUMIN SERPL-MCNC: 4.8 G/DL (ref 3.6–5.1)
ALP SERPL-CCNC: 32 U/L (ref 36–130)
ALT SERPL-CCNC: 15 U/L (ref 9–46)
ANION GAP SERPL CALCULATED.4IONS-SCNC: 6 MMOL/L (CALC) (ref 7–17)
AST SERPL-CCNC: 14 U/L (ref 10–40)
BASOPHILS # BLD AUTO: 38 CELLS/UL (ref 0–200)
BASOPHILS NFR BLD AUTO: 0.6 %
BILIRUB SERPL-MCNC: 0.6 MG/DL (ref 0.2–1.2)
BUN SERPL-MCNC: 15 MG/DL (ref 7–25)
CALCIUM SERPL-MCNC: 10.1 MG/DL (ref 8.6–10.3)
CHLORIDE SERPL-SCNC: 99 MMOL/L (ref 98–110)
CHOLEST SERPL-MCNC: 215 MG/DL
CHOLEST/HDLC SERPL: 4.4 (CALC)
CO2 SERPL-SCNC: 32 MMOL/L (ref 20–32)
CREAT SERPL-MCNC: 0.73 MG/DL (ref 0.6–1.26)
EGFRCR SERPLBLD CKD-EPI 2021: 124 ML/MIN/1.73M2
EOSINOPHIL # BLD AUTO: 69 CELLS/UL (ref 15–500)
EOSINOPHIL NFR BLD AUTO: 1.1 %
ERYTHROCYTE [DISTWIDTH] IN BLOOD BY AUTOMATED COUNT: 11.6 % (ref 11–15)
EST. AVERAGE GLUCOSE BLD GHB EST-MCNC: 117 MG/DL
EST. AVERAGE GLUCOSE BLD GHB EST-SCNC: 6.5 MMOL/L
GLUCOSE SERPL-MCNC: 99 MG/DL (ref 65–99)
HBA1C MFR BLD: 5.7 % OF TOTAL HGB
HCT VFR BLD AUTO: 43 % (ref 38.5–50)
HDLC SERPL-MCNC: 49 MG/DL
HGB BLD-MCNC: 14.3 G/DL (ref 13.2–17.1)
LDLC SERPL CALC-MCNC: 145 MG/DL (CALC)
LYMPHOCYTES # BLD AUTO: 1682 CELLS/UL (ref 850–3900)
LYMPHOCYTES NFR BLD AUTO: 26.7 %
MCH RBC QN AUTO: 30.4 PG (ref 27–33)
MCHC RBC AUTO-ENTMCNC: 33.3 G/DL (ref 32–36)
MCV RBC AUTO: 91.3 FL (ref 80–100)
MONOCYTES # BLD AUTO: 359 CELLS/UL (ref 200–950)
MONOCYTES NFR BLD AUTO: 5.7 %
NEUTROPHILS # BLD AUTO: 4152 CELLS/UL (ref 1500–7800)
NEUTROPHILS NFR BLD AUTO: 65.9 %
NONHDLC SERPL-MCNC: 166 MG/DL (CALC)
PLATELET # BLD AUTO: 348 THOUSAND/UL (ref 140–400)
PMV BLD REES-ECKER: 9.4 FL (ref 7.5–12.5)
POTASSIUM SERPL-SCNC: 4.2 MMOL/L (ref 3.5–5.3)
PROT SERPL-MCNC: 7.3 G/DL (ref 6.1–8.1)
RBC # BLD AUTO: 4.71 MILLION/UL (ref 4.2–5.8)
SODIUM SERPL-SCNC: 137 MMOL/L (ref 135–146)
TRIGL SERPL-MCNC: 101 MG/DL
TSH SERPL-ACNC: 1.23 MIU/L (ref 0.4–4.5)
WBC # BLD AUTO: 6.3 THOUSAND/UL (ref 3.8–10.8)

## 2025-04-07 ASSESSMENT — ENCOUNTER SYMPTOMS
AGITATION: 0
ABDOMINAL PAIN: 0
NAUSEA: 0
VOMITING: 0
FEVER: 0
DIZZINESS: 0
COUGH: 0
CONFUSION: 0
LIGHT-HEADEDNESS: 0
SORE THROAT: 0
CHILLS: 0
SHORTNESS OF BREATH: 0

## 2025-06-04 ENCOUNTER — APPOINTMENT (OUTPATIENT)
Dept: PRIMARY CARE | Facility: CLINIC | Age: 35
End: 2025-06-04
Payer: COMMERCIAL

## 2025-06-04 VITALS
HEART RATE: 84 BPM | TEMPERATURE: 97.3 F | DIASTOLIC BLOOD PRESSURE: 74 MMHG | HEIGHT: 72 IN | BODY MASS INDEX: 28.58 KG/M2 | WEIGHT: 211 LBS | SYSTOLIC BLOOD PRESSURE: 129 MMHG

## 2025-06-04 DIAGNOSIS — R73.03 PREDIABETES: ICD-10-CM

## 2025-06-04 DIAGNOSIS — E66.3 OVERWEIGHT (BMI 25.0-29.9): ICD-10-CM

## 2025-06-04 DIAGNOSIS — E55.9 VITAMIN D DEFICIENCY: ICD-10-CM

## 2025-06-04 DIAGNOSIS — R73.09 ELEVATED HEMOGLOBIN A1C: ICD-10-CM

## 2025-06-04 DIAGNOSIS — Z00.00 HEALTH MAINTENANCE EXAMINATION: Primary | ICD-10-CM

## 2025-06-04 DIAGNOSIS — J45.20 MILD INTERMITTENT ASTHMA WITHOUT COMPLICATION (HHS-HCC): ICD-10-CM

## 2025-06-04 DIAGNOSIS — E78.2 MIXED HYPERLIPIDEMIA: ICD-10-CM

## 2025-06-04 PROCEDURE — 1036F TOBACCO NON-USER: CPT | Performed by: INTERNAL MEDICINE

## 2025-06-04 PROCEDURE — 3008F BODY MASS INDEX DOCD: CPT | Performed by: INTERNAL MEDICINE

## 2025-06-04 PROCEDURE — 99395 PREV VISIT EST AGE 18-39: CPT | Performed by: INTERNAL MEDICINE

## 2025-06-04 PROCEDURE — 99213 OFFICE O/P EST LOW 20 MIN: CPT | Performed by: INTERNAL MEDICINE

## 2025-06-04 PROCEDURE — 90471 IMMUNIZATION ADMIN: CPT | Performed by: INTERNAL MEDICINE

## 2025-06-04 PROCEDURE — 90677 PCV20 VACCINE IM: CPT | Performed by: INTERNAL MEDICINE

## 2025-06-04 RX ORDER — TOPIRAMATE 25 MG/1
25 TABLET, FILM COATED ORAL 2 TIMES DAILY
Qty: 180 TABLET | Refills: 1 | Status: SHIPPED | OUTPATIENT
Start: 2025-06-04 | End: 2025-12-01

## 2025-06-04 RX ORDER — ROSUVASTATIN CALCIUM 5 MG/1
5 TABLET, COATED ORAL DAILY
Qty: 90 TABLET | Refills: 1 | Status: SHIPPED | OUTPATIENT
Start: 2025-06-04 | End: 2025-12-01

## 2025-06-04 ASSESSMENT — PATIENT HEALTH QUESTIONNAIRE - PHQ9
1. LITTLE INTEREST OR PLEASURE IN DOING THINGS: NOT AT ALL
SUM OF ALL RESPONSES TO PHQ9 QUESTIONS 1 AND 2: 0
2. FEELING DOWN, DEPRESSED OR HOPELESS: NOT AT ALL

## 2025-06-04 ASSESSMENT — ENCOUNTER SYMPTOMS
DIARRHEA: 0
DYSURIA: 0
PALPITATIONS: 0
ABDOMINAL PAIN: 0
DIZZINESS: 0
COUGH: 0
SHORTNESS OF BREATH: 0
VOMITING: 0
LIGHT-HEADEDNESS: 0
BLOOD IN STOOL: 0
AGITATION: 0
NAUSEA: 0
CONSTIPATION: 0
CONFUSION: 0
FEVER: 0
SORE THROAT: 0
ARTHRALGIAS: 1
CHILLS: 0

## 2025-06-04 NOTE — PROGRESS NOTES
Subjective   Wisam Corley is a 34 y.o. male and is here for a comprehensive physical exam. The patient reports problems - fragmentation within the lateral femoral condyle new fracture work related injury.    Do you take any herbs or supplements that were not prescribed by a doctor? {yes/no/not asked:9010}  Are you taking calcium supplements? {yes/no:414615}  Are you taking aspirin daily? {yes/no:952604}      History:  {gu history select gender:38974}    Do you have pain that bothers you in your daily life? {yes/no/not asked:9010}  Review of Systems     Objective   Physical Exam    Assessment/Plan   Healthy male exam. ***     1. ***  2. Patient Counseling:  --Nutrition: Stressed importance of moderation in sodium/caffeine intake, saturated fat and cholesterol, caloric balance, sufficient intake of fresh fruits, vegetables, fiber, calcium, iron, and 1 mg of folate supplement per day (for females capable of pregnancy).  --Discussed the issue of estrogen replacement, calcium supplement, and the daily use of baby aspirin.  --Exercise: Stressed the importance of regular exercise.   --Substance Abuse: Discussed cessation/primary prevention of tobacco, alcohol, or other drug use; driving or other dangerous activities under the influence; availability of treatment for abuse.    --Sexuality: Discussed sexually transmitted diseases, partner selection, use of condoms, avoidance of unintended pregnancy  and contraceptive alternatives.   --Injury prevention: Discussed safety belts, safety helmets, smoke detector, smoking near bedding or upholstery.   --Dental health: Discussed importance of regular tooth brushing, flossing, and dental visits.  --Immunizations reviewed.  --Discussed benefits of screening colonoscopy.  --After hours service discussed with patient  3. Discussed the patient's BMI with him.  The BMI {BMI plan (MU F measure 421):86145}  4. Follow up {follow-up interval:54932}

## 2025-06-04 NOTE — PROGRESS NOTES
Subjective   Wisam Corley is a 34 y.o. male and is here for a comprehensive physical exam. The patient reports problems - fragmentation within the lateral femoral condyle new fracture work related injury.    Do you take any herbs or supplements that were not prescribed by a doctor? no  Are you taking calcium supplements? no  Are you taking aspirin daily? no     History of Present Illness  Wisam Corley is a 34 year old male with avascular osteonecrosis who presents with a right knee fracture and AWV.     He sustained a right knee fracture at the right condyle approximately two to three weeks after his last visit. He is scheduled for an MRI on Monday to further evaluate the injury. He is currently on workman's compensation due to the injury and anticipates a recovery time of nine to twelve months, with a minimum of six months if recovery is perfect.    His history of avascular osteonecrosis complicates the fracture treatment as it requires bone grafting.      He has experienced weight gain, reaching 211 pounds, attributed to being sedentary for three and a half months due to his knee injury. He has been playing computer games and 'Magic the Gathering' during this time. He is considering weight loss options post-surgery.    He has a history of elevated cholesterol, with a total cholesterol level of 215 mg/dL and LDL of 145 mg/dL. He was previously on cholesterol medication, which effectively controlled his levels. His A1c is slightly elevated at 5.7%, which he attributes to consuming a liter of cranberry juice daily while on medication. His vitamin D levels have normalized after being previously low.    He has asthma, which flares up during the summer and winter months. He uses an inhaler as needed and does not smoke due to lung damage from COVID pneumonia. He has a history of pneumonia and is considered at increased risk due to asthma. He has had pneumonia a couple of times in the past.  He will be administered  the pneumonia vaccine today.    He is currently taking Topamax once a day to help maintain weight, although his sleep schedule has been disrupted due to being out of work. No leg swelling.    Patient is up-to-date on age and gender recommended screening exception of pneumonia vaccine which will be given today.  Defers HIV and hepatitis C screening.  Is up-to-date on blood work which was recently completed in March      Do you have pain that bothers you in your daily life? yes  Review of Systems   Constitutional:  Negative for chills and fever.   HENT:  Negative for sore throat.    Eyes:  Negative for visual disturbance.   Respiratory:  Negative for cough and shortness of breath.    Cardiovascular:  Negative for chest pain, palpitations and leg swelling.   Gastrointestinal:  Negative for abdominal pain, blood in stool, constipation, diarrhea, nausea and vomiting.   Genitourinary:  Negative for dysuria.   Musculoskeletal:  Positive for arthralgias.   Skin:  Negative for rash.   Neurological:  Negative for dizziness, syncope and light-headedness.   Psychiatric/Behavioral:  Negative for agitation and confusion.         Objective   Physical Exam  Vitals and nursing note reviewed.   Constitutional:       General: He is not in acute distress.     Appearance: Normal appearance. He is not ill-appearing, toxic-appearing or diaphoretic.   HENT:      Head: Normocephalic and atraumatic.      Mouth/Throat:      Mouth: Mucous membranes are moist.      Pharynx: Oropharynx is clear. No oropharyngeal exudate.   Eyes:      Pupils: Pupils are equal, round, and reactive to light.   Cardiovascular:      Rate and Rhythm: Normal rate and regular rhythm.      Heart sounds: Normal heart sounds.   Pulmonary:      Effort: Pulmonary effort is normal. No respiratory distress.      Breath sounds: Normal breath sounds. No wheezing or rhonchi.   Abdominal:      General: There is no distension.      Palpations: Abdomen is soft.      Tenderness:  There is no abdominal tenderness.   Musculoskeletal:      Cervical back: Neck supple.      Right lower leg: No edema.      Left lower leg: No edema.      Comments: Right knee in brace   Lymphadenopathy:      Cervical: No cervical adenopathy.   Skin:     General: Skin is warm and dry.      Coloration: Skin is not jaundiced or pale.      Findings: No rash.   Neurological:      General: No focal deficit present.      Mental Status: He is alert and oriented to person, place, and time.      Cranial Nerves: No cranial nerve deficit.   Psychiatric:         Mood and Affect: Mood normal.         Behavior: Behavior normal.         Thought Content: Thought content normal.         Judgment: Judgment normal.       Physical Exam  ABDOMEN: Non-tender abdomen.  EXTREMITIES: Mild leg swelling.  Assessment/Plan   Assessment & Plan  Fracture of the right knee condyle with avascular osteonecrosis  Sustained a fracture of the right knee condyle near an area of avascular osteonecrosis, necessitating a bone graft. Currently on workman's compensation and awaiting surgery, which involves finding a cadaver donor for the graft. Recovery is expected to take 9 to 12 months, with a minimum of 6 months if everything goes perfectly. The fracture was confirmed as new by a state doctor after a thorough evaluation.  - Proceed with MRI on Monday to assess the fracture and osteonecrosis.  - Coordinate with orthopedic specialists for surgery scheduling after MRI results and donor availability.  - Continue workman's compensation support during recovery period.    Asthma  Asthma generally well-controlled but exacerbates during summer and winter. Uses an inhaler as needed, which is expensive. At increased risk for pneumonia due to asthma and has had pneumonia in the past. COVID pneumonia history increases risk for future bacterial pneumonia.  - Administer pneumonia vaccination to reduce the risk of future pneumonia, especially with upcoming surgery.  -  Continue current inhaler use as needed.    Mixed hyperlipidemia  Total cholesterol elevated at 215 mg/dL, with an LDL of 145 mg/dL. Levels are higher than previous measurements. Previously responded well to rosuvastatin, which controlled cholesterol levels effectively. Family history of hypertension but not vascular or heart problems.  - Restart rosuvastatin with a 90-day supply and refills to be sent to the pharmacy.  - Plan to re-evaluate cholesterol levels in 3 to 6 months.    Prediabetes  A1c elevated at 5.7%, higher than previous normal level. High carbohydrate intake, such as a liter of cranberry juice daily, may have contributed to elevated A1c.  - Advise reducing high carbohydrate intake, such as cranberry juice, to help manage blood sugar levels.  - Plan to re-evaluate A1c levels in 3 to 6 months.    Overweight with BMI of 26 to 26.9  Gained weight due to inactivity following knee injury. Considering weight management options post-surgery. Currently taking Topamax at bedtime to help maintain weight.  - Continue Topamax at bedtime for weight management.  - Re-evaluate weight management options after knee surgery and recovery.    Vitamin D deficiency  Vitamin D levels currently within the upper limits of normal after previous deficiency. Advised to maintain current vitamin D intake to prevent deficiency.  - Continue current vitamin D supplementation as needed.    General Health Maintenance  Up to date with most screenings and vaccinations. Candidate for pneumonia vaccine due to asthma and previous pneumonia history. Surgery increases risk for pneumonia.  - Administer pneumonia vaccine during this visit.  - Plan to repeat labs in 3 to 6 months to monitor cholesterol and A1c levels.  Healthy male exam.      1.  Patient up-to-date on age and gender recommended screening.  Patient up-to-date on age and recommended vaccinations exception pneumonia vaccine to be given today.  2. Patient Counseling:  --Nutrition:  Stressed importance of moderation in sodium/caffeine intake, saturated fat and cholesterol, caloric balance, sufficient intake of fresh fruits, vegetables, fiber, calcium, iron, and 1 mg of folate supplement per day (for females capable of pregnancy).  --Discussed the issue of estrogen replacement, calcium supplement, and the daily use of baby aspirin.  --Exercise: Stressed the importance of regular exercise.   --Substance Abuse: Discussed cessation/primary prevention of tobacco, alcohol, or other drug use; driving or other dangerous activities under the influence; availability of treatment for abuse.    --Sexuality: Discussed sexually transmitted diseases, partner selection, use of condoms, avoidance of unintended pregnancy  and contraceptive alternatives.   --Injury prevention: Discussed safety belts, safety helmets, smoke detector, smoking near bedding or upholstery.   --Dental health: Discussed importance of regular tooth brushing, flossing, and dental visits.  --Immunizations reviewed.  --Discussed benefits of screening colonoscopy.  --After hours service discussed with patient  3. Discussed the patient's BMI with him.  The BMI is above average. The patient received Provided instructions on dietary changes  Provided instructions on exercise  Advised to Increase physical activity because they have an above normal BMI.  4. Follow up in 3 months    Sal Luis DO     This medical note was created with the assistance of artificial intelligence (AI) for documentation purposes. The content has been reviewed and confirmed by the healthcare provider for accuracy and completeness. Patient consented to the use of audio recording and use of AI during their visit.

## 2025-07-31 ENCOUNTER — APPOINTMENT (OUTPATIENT)
Dept: RADIOLOGY | Facility: HOSPITAL | Age: 35
End: 2025-07-31
Payer: COMMERCIAL

## 2025-07-31 ENCOUNTER — APPOINTMENT (OUTPATIENT)
Dept: CARDIOLOGY | Facility: HOSPITAL | Age: 35
End: 2025-07-31
Payer: COMMERCIAL

## 2025-07-31 ENCOUNTER — HOSPITAL ENCOUNTER (OUTPATIENT)
Facility: HOSPITAL | Age: 35
Setting detail: OBSERVATION
Discharge: HOME | End: 2025-08-01
Attending: EMERGENCY MEDICINE | Admitting: STUDENT IN AN ORGANIZED HEALTH CARE EDUCATION/TRAINING PROGRAM
Payer: COMMERCIAL

## 2025-07-31 DIAGNOSIS — R07.9 CHEST PAIN, UNSPECIFIED TYPE: ICD-10-CM

## 2025-07-31 DIAGNOSIS — R07.81 PLEURITIC CHEST PAIN: ICD-10-CM

## 2025-07-31 DIAGNOSIS — R55 SYNCOPE AND COLLAPSE: ICD-10-CM

## 2025-07-31 DIAGNOSIS — M79.89 OTHER SPECIFIED SOFT TISSUE DISORDERS: ICD-10-CM

## 2025-07-31 DIAGNOSIS — M79.604 PAIN IN RIGHT LEG: Primary | ICD-10-CM

## 2025-07-31 PROBLEM — F41.9 ANXIETY: Status: RESOLVED | Noted: 2025-07-28 | Resolved: 2025-07-31

## 2025-07-31 PROBLEM — Z96.651 S/P TOTAL KNEE ARTHROPLASTY, RIGHT: Status: RESOLVED | Noted: 2025-07-30 | Resolved: 2025-07-31

## 2025-07-31 PROBLEM — U07.1 COVID-19: Status: RESOLVED | Noted: 2025-07-31 | Resolved: 2025-07-31

## 2025-07-31 LAB
ALBUMIN SERPL BCP-MCNC: 4.1 G/DL (ref 3.4–5)
ALP SERPL-CCNC: 26 U/L (ref 33–120)
ALT SERPL W P-5'-P-CCNC: 11 U/L (ref 10–52)
ANION GAP SERPL CALC-SCNC: 11 MMOL/L (ref 10–20)
AST SERPL W P-5'-P-CCNC: 13 U/L (ref 9–39)
ATRIAL RATE: 61 BPM
ATRIAL RATE: 74 BPM
BASOPHILS # BLD AUTO: 0.05 X10*3/UL (ref 0–0.1)
BASOPHILS NFR BLD AUTO: 0.3 %
BILIRUB SERPL-MCNC: 0.3 MG/DL (ref 0–1.2)
BUN SERPL-MCNC: 14 MG/DL (ref 6–23)
CALCIUM SERPL-MCNC: 8.7 MG/DL (ref 8.6–10.3)
CARDIAC TROPONIN I PNL SERPL HS: 6 NG/L (ref 0–20)
CARDIAC TROPONIN I PNL SERPL HS: 6 NG/L (ref 0–20)
CHLORIDE SERPL-SCNC: 101 MMOL/L (ref 98–107)
CO2 SERPL-SCNC: 31 MMOL/L (ref 21–32)
CREAT SERPL-MCNC: 0.85 MG/DL (ref 0.5–1.3)
EGFRCR SERPLBLD CKD-EPI 2021: >90 ML/MIN/1.73M*2
EOSINOPHIL # BLD AUTO: 0.16 X10*3/UL (ref 0–0.7)
EOSINOPHIL NFR BLD AUTO: 0.9 %
ERYTHROCYTE [DISTWIDTH] IN BLOOD BY AUTOMATED COUNT: 12 % (ref 11.5–14.5)
GLUCOSE SERPL-MCNC: 127 MG/DL (ref 74–99)
HCT VFR BLD AUTO: 36.6 % (ref 41–52)
HGB BLD-MCNC: 12 G/DL (ref 13.5–17.5)
IMM GRANULOCYTES # BLD AUTO: 0.1 X10*3/UL (ref 0–0.7)
IMM GRANULOCYTES NFR BLD AUTO: 0.6 % (ref 0–0.9)
LYMPHOCYTES # BLD AUTO: 2.12 X10*3/UL (ref 1.2–4.8)
LYMPHOCYTES NFR BLD AUTO: 11.9 %
MAGNESIUM SERPL-MCNC: 1.87 MG/DL (ref 1.6–2.4)
MCH RBC QN AUTO: 30.1 PG (ref 26–34)
MCHC RBC AUTO-ENTMCNC: 32.8 G/DL (ref 32–36)
MCV RBC AUTO: 92 FL (ref 80–100)
MONOCYTES # BLD AUTO: 1.52 X10*3/UL (ref 0.1–1)
MONOCYTES NFR BLD AUTO: 8.6 %
NEUTROPHILS # BLD AUTO: 13.8 X10*3/UL (ref 1.2–7.7)
NEUTROPHILS NFR BLD AUTO: 77.7 %
NRBC BLD-RTO: 0 /100 WBCS (ref 0–0)
P AXIS: -11 DEGREES
P AXIS: 23 DEGREES
P OFFSET: 186 MS
P OFFSET: 192 MS
P ONSET: 135 MS
P ONSET: 138 MS
PLATELET # BLD AUTO: 308 X10*3/UL (ref 150–450)
POTASSIUM SERPL-SCNC: 3.6 MMOL/L (ref 3.5–5.3)
PR INTERVAL: 172 MS
PR INTERVAL: 176 MS
PROT SERPL-MCNC: 7 G/DL (ref 6.4–8.2)
Q ONSET: 223 MS
Q ONSET: 224 MS
QRS COUNT: 10 BEATS
QRS COUNT: 12 BEATS
QRS DURATION: 100 MS
QRS DURATION: 104 MS
QT INTERVAL: 374 MS
QT INTERVAL: 390 MS
QTC CALCULATION(BAZETT): 392 MS
QTC CALCULATION(BAZETT): 415 MS
QTC FREDERICIA: 391 MS
QTC FREDERICIA: 401 MS
R AXIS: 60 DEGREES
R AXIS: 68 DEGREES
RBC # BLD AUTO: 3.99 X10*6/UL (ref 4.5–5.9)
SODIUM SERPL-SCNC: 139 MMOL/L (ref 136–145)
T AXIS: 41 DEGREES
T AXIS: 44 DEGREES
T OFFSET: 410 MS
T OFFSET: 419 MS
VENTRICULAR RATE: 61 BPM
VENTRICULAR RATE: 74 BPM
WBC # BLD AUTO: 17.8 X10*3/UL (ref 4.4–11.3)

## 2025-07-31 PROCEDURE — 2500000001 HC RX 250 WO HCPCS SELF ADMINISTERED DRUGS (ALT 637 FOR MEDICARE OP): Performed by: NURSE PRACTITIONER

## 2025-07-31 PROCEDURE — 71045 X-RAY EXAM CHEST 1 VIEW: CPT | Performed by: RADIOLOGY

## 2025-07-31 PROCEDURE — 71275 CT ANGIOGRAPHY CHEST: CPT

## 2025-07-31 PROCEDURE — 73590 X-RAY EXAM OF LOWER LEG: CPT | Mod: RT

## 2025-07-31 PROCEDURE — 84484 ASSAY OF TROPONIN QUANT: CPT | Performed by: EMERGENCY MEDICINE

## 2025-07-31 PROCEDURE — 83735 ASSAY OF MAGNESIUM: CPT | Performed by: EMERGENCY MEDICINE

## 2025-07-31 PROCEDURE — 71045 X-RAY EXAM CHEST 1 VIEW: CPT

## 2025-07-31 PROCEDURE — 2500000004 HC RX 250 GENERAL PHARMACY W/ HCPCS (ALT 636 FOR OP/ED): Performed by: NURSE PRACTITIONER

## 2025-07-31 PROCEDURE — 73564 X-RAY EXAM KNEE 4 OR MORE: CPT | Mod: RT

## 2025-07-31 PROCEDURE — 96372 THER/PROPH/DIAG INJ SC/IM: CPT | Mod: 59 | Performed by: NURSE PRACTITIONER

## 2025-07-31 PROCEDURE — 73552 X-RAY EXAM OF FEMUR 2/>: CPT | Mod: RT

## 2025-07-31 PROCEDURE — 2500000004 HC RX 250 GENERAL PHARMACY W/ HCPCS (ALT 636 FOR OP/ED): Performed by: EMERGENCY MEDICINE

## 2025-07-31 PROCEDURE — 36415 COLL VENOUS BLD VENIPUNCTURE: CPT | Performed by: EMERGENCY MEDICINE

## 2025-07-31 PROCEDURE — G0378 HOSPITAL OBSERVATION PER HR: HCPCS

## 2025-07-31 PROCEDURE — 80053 COMPREHEN METABOLIC PANEL: CPT | Performed by: EMERGENCY MEDICINE

## 2025-07-31 PROCEDURE — 2550000001 HC RX 255 CONTRASTS: Performed by: STUDENT IN AN ORGANIZED HEALTH CARE EDUCATION/TRAINING PROGRAM

## 2025-07-31 PROCEDURE — 93971 EXTREMITY STUDY: CPT | Performed by: INTERNAL MEDICINE

## 2025-07-31 PROCEDURE — 99222 1ST HOSP IP/OBS MODERATE 55: CPT | Performed by: NURSE PRACTITIONER

## 2025-07-31 PROCEDURE — 85025 COMPLETE CBC W/AUTO DIFF WBC: CPT | Performed by: EMERGENCY MEDICINE

## 2025-07-31 PROCEDURE — 71275 CT ANGIOGRAPHY CHEST: CPT | Performed by: RADIOLOGY

## 2025-07-31 PROCEDURE — 99285 EMERGENCY DEPT VISIT HI MDM: CPT | Mod: 25 | Performed by: EMERGENCY MEDICINE

## 2025-07-31 PROCEDURE — 2500000001 HC RX 250 WO HCPCS SELF ADMINISTERED DRUGS (ALT 637 FOR MEDICARE OP): Performed by: EMERGENCY MEDICINE

## 2025-07-31 PROCEDURE — 93005 ELECTROCARDIOGRAM TRACING: CPT

## 2025-07-31 PROCEDURE — 2500000001 HC RX 250 WO HCPCS SELF ADMINISTERED DRUGS (ALT 637 FOR MEDICARE OP): Performed by: STUDENT IN AN ORGANIZED HEALTH CARE EDUCATION/TRAINING PROGRAM

## 2025-07-31 PROCEDURE — 93971 EXTREMITY STUDY: CPT

## 2025-07-31 PROCEDURE — 96374 THER/PROPH/DIAG INJ IV PUSH: CPT | Mod: 59

## 2025-07-31 PROCEDURE — 99285 EMERGENCY DEPT VISIT HI MDM: CPT | Performed by: EMERGENCY MEDICINE

## 2025-07-31 PROCEDURE — 2500000002 HC RX 250 W HCPCS SELF ADMINISTERED DRUGS (ALT 637 FOR MEDICARE OP, ALT 636 FOR OP/ED): Performed by: NURSE PRACTITIONER

## 2025-07-31 PROCEDURE — 96375 TX/PRO/DX INJ NEW DRUG ADDON: CPT

## 2025-07-31 PROCEDURE — 96376 TX/PRO/DX INJ SAME DRUG ADON: CPT

## 2025-07-31 RX ORDER — FLUTICASONE PROPIONATE 50 MCG
1 SPRAY, SUSPENSION (ML) NASAL 2 TIMES DAILY
Status: DISCONTINUED | OUTPATIENT
Start: 2025-07-31 | End: 2025-08-01 | Stop reason: HOSPADM

## 2025-07-31 RX ORDER — CEFADROXIL 500 MG/1
500 CAPSULE ORAL 2 TIMES DAILY
COMMUNITY
Start: 2025-07-30 | End: 2025-08-06

## 2025-07-31 RX ORDER — PANTOPRAZOLE SODIUM 20 MG/1
20 TABLET, DELAYED RELEASE ORAL
Status: DISCONTINUED | OUTPATIENT
Start: 2025-08-01 | End: 2025-08-01 | Stop reason: HOSPADM

## 2025-07-31 RX ORDER — PANTOPRAZOLE SODIUM 20 MG/1
20 TABLET, DELAYED RELEASE ORAL
COMMUNITY

## 2025-07-31 RX ORDER — HYDROMORPHONE HYDROCHLORIDE 1 MG/ML
1 INJECTION, SOLUTION INTRAMUSCULAR; INTRAVENOUS; SUBCUTANEOUS ONCE
Status: COMPLETED | OUTPATIENT
Start: 2025-07-31 | End: 2025-07-31

## 2025-07-31 RX ORDER — TALC
3 POWDER (GRAM) TOPICAL NIGHTLY PRN
Status: DISCONTINUED | OUTPATIENT
Start: 2025-07-31 | End: 2025-08-01 | Stop reason: HOSPADM

## 2025-07-31 RX ORDER — DOCUSATE SODIUM 100 MG/1
100 CAPSULE, LIQUID FILLED ORAL 2 TIMES DAILY PRN
Status: DISCONTINUED | OUTPATIENT
Start: 2025-07-31 | End: 2025-08-01 | Stop reason: HOSPADM

## 2025-07-31 RX ORDER — TOPIRAMATE 25 MG/1
25 TABLET, FILM COATED ORAL 2 TIMES DAILY
Status: DISCONTINUED | OUTPATIENT
Start: 2025-07-31 | End: 2025-08-01 | Stop reason: HOSPADM

## 2025-07-31 RX ORDER — OXYCODONE HYDROCHLORIDE 5 MG/1
1-2 TABLET ORAL EVERY 6 HOURS PRN
COMMUNITY
Start: 2025-07-30 | End: 2025-08-06

## 2025-07-31 RX ORDER — ASPIRIN 81 MG/1
81 TABLET ORAL 2 TIMES DAILY
COMMUNITY
Start: 2025-07-30

## 2025-07-31 RX ORDER — ASCORBIC ACID 500 MG
500 TABLET ORAL 2 TIMES DAILY
COMMUNITY
Start: 2025-07-30

## 2025-07-31 RX ORDER — ASCORBIC ACID 500 MG
500 TABLET ORAL 2 TIMES DAILY
Status: DISCONTINUED | OUTPATIENT
Start: 2025-07-31 | End: 2025-08-01 | Stop reason: HOSPADM

## 2025-07-31 RX ORDER — ACETAMINOPHEN 500 MG
1000 TABLET ORAL EVERY 8 HOURS PRN
COMMUNITY

## 2025-07-31 RX ORDER — DOCUSATE SODIUM 100 MG/1
100 CAPSULE, LIQUID FILLED ORAL 2 TIMES DAILY PRN
COMMUNITY

## 2025-07-31 RX ORDER — MELOXICAM 15 MG/1
15 TABLET ORAL DAILY
COMMUNITY
Start: 2025-07-30 | End: 2025-08-13

## 2025-07-31 RX ORDER — ACETAMINOPHEN 325 MG/1
1000 TABLET ORAL EVERY 8 HOURS PRN
Status: DISCONTINUED | OUTPATIENT
Start: 2025-07-31 | End: 2025-08-01 | Stop reason: HOSPADM

## 2025-07-31 RX ORDER — ENOXAPARIN SODIUM 100 MG/ML
1 INJECTION SUBCUTANEOUS EVERY 12 HOURS
Status: DISCONTINUED | OUTPATIENT
Start: 2025-07-31 | End: 2025-08-01 | Stop reason: HOSPADM

## 2025-07-31 RX ORDER — MELOXICAM 15 MG/1
15 TABLET ORAL DAILY
Status: DISCONTINUED | OUTPATIENT
Start: 2025-07-31 | End: 2025-08-01 | Stop reason: HOSPADM

## 2025-07-31 RX ORDER — ACETAMINOPHEN AND PHENYLEPHRINE HCL 325; 5 MG/1; MG/1
1 TABLET ORAL DAILY
COMMUNITY

## 2025-07-31 RX ORDER — POLYETHYLENE GLYCOL 3350 17 G/17G
17 POWDER, FOR SOLUTION ORAL DAILY PRN
COMMUNITY

## 2025-07-31 RX ORDER — ROSUVASTATIN CALCIUM 5 MG/1
5 TABLET, COATED ORAL NIGHTLY
Status: DISCONTINUED | OUTPATIENT
Start: 2025-07-31 | End: 2025-08-01 | Stop reason: HOSPADM

## 2025-07-31 RX ORDER — ONDANSETRON HYDROCHLORIDE 2 MG/ML
4 INJECTION, SOLUTION INTRAVENOUS ONCE
Status: COMPLETED | OUTPATIENT
Start: 2025-07-31 | End: 2025-07-31

## 2025-07-31 RX ORDER — OXYCODONE HYDROCHLORIDE 5 MG/1
5 TABLET ORAL EVERY 4 HOURS PRN
Refills: 0 | Status: DISCONTINUED | OUTPATIENT
Start: 2025-07-31 | End: 2025-08-01 | Stop reason: HOSPADM

## 2025-07-31 RX ORDER — ASPIRIN 81 MG/1
81 TABLET ORAL 2 TIMES DAILY
Status: DISCONTINUED | OUTPATIENT
Start: 2025-07-31 | End: 2025-08-01 | Stop reason: HOSPADM

## 2025-07-31 RX ORDER — PREDNISONE 50 MG/1
50 TABLET ORAL EVERY 6 HOURS
Status: COMPLETED | OUTPATIENT
Start: 2025-07-31 | End: 2025-07-31

## 2025-07-31 RX ORDER — LANOLIN ALCOHOL/MO/W.PET/CERES
1000 CREAM (GRAM) TOPICAL DAILY
Status: DISCONTINUED | OUTPATIENT
Start: 2025-07-31 | End: 2025-08-01 | Stop reason: HOSPADM

## 2025-07-31 RX ORDER — DIPHENHYDRAMINE HCL 25 MG
50 TABLET ORAL ONCE
Status: COMPLETED | OUTPATIENT
Start: 2025-07-31 | End: 2025-07-31

## 2025-07-31 RX ORDER — CEFADROXIL 500 MG/1
500 CAPSULE ORAL 2 TIMES DAILY
Status: DISCONTINUED | OUTPATIENT
Start: 2025-07-31 | End: 2025-08-01 | Stop reason: HOSPADM

## 2025-07-31 RX ORDER — POLYETHYLENE GLYCOL 3350 17 G/17G
17 POWDER, FOR SOLUTION ORAL DAILY PRN
Status: DISCONTINUED | OUTPATIENT
Start: 2025-07-31 | End: 2025-08-01 | Stop reason: HOSPADM

## 2025-07-31 RX ADMIN — OXYCODONE HYDROCHLORIDE 5 MG: 5 TABLET ORAL at 21:32

## 2025-07-31 RX ADMIN — ENOXAPARIN SODIUM 100 MG: 100 INJECTION SUBCUTANEOUS at 14:19

## 2025-07-31 RX ADMIN — ONDANSETRON 4 MG: 2 INJECTION INTRAMUSCULAR; INTRAVENOUS at 07:49

## 2025-07-31 RX ADMIN — OXYCODONE HYDROCHLORIDE AND ACETAMINOPHEN 500 MG: 500 TABLET ORAL at 21:32

## 2025-07-31 RX ADMIN — OXYCODONE HYDROCHLORIDE AND ACETAMINOPHEN 500 MG: 500 TABLET ORAL at 12:28

## 2025-07-31 RX ADMIN — HYDROMORPHONE HYDROCHLORIDE 1 MG: 1 INJECTION, SOLUTION INTRAMUSCULAR; INTRAVENOUS; SUBCUTANEOUS at 10:06

## 2025-07-31 RX ADMIN — HYDROMORPHONE HYDROCHLORIDE 1 MG: 1 INJECTION, SOLUTION INTRAMUSCULAR; INTRAVENOUS; SUBCUTANEOUS at 07:52

## 2025-07-31 RX ADMIN — TOPIRAMATE 25 MG: 25 TABLET, FILM COATED ORAL at 21:32

## 2025-07-31 RX ADMIN — IOHEXOL 60 ML: 350 INJECTION, SOLUTION INTRAVENOUS at 22:25

## 2025-07-31 RX ADMIN — ROSUVASTATIN CALCIUM 5 MG: 5 TABLET, FILM COATED ORAL at 21:32

## 2025-07-31 RX ADMIN — PREDNISONE 50 MG: 50 TABLET ORAL at 15:33

## 2025-07-31 RX ADMIN — CEFADROXIL 500 MG: 500 CAPSULE ORAL at 21:31

## 2025-07-31 RX ADMIN — ASPIRIN 81 MG: 81 TABLET, COATED ORAL at 12:28

## 2025-07-31 RX ADMIN — CEFADROXIL 500 MG: 500 CAPSULE ORAL at 14:19

## 2025-07-31 RX ADMIN — Medication 1000 MCG: at 12:28

## 2025-07-31 RX ADMIN — FLUTICASONE PROPIONATE 1 SPRAY: 50 SPRAY, METERED NASAL at 14:19

## 2025-07-31 RX ADMIN — TOPIRAMATE 25 MG: 25 TABLET, FILM COATED ORAL at 12:28

## 2025-07-31 RX ADMIN — OXYCODONE HYDROCHLORIDE 5 MG: 5 TABLET ORAL at 12:13

## 2025-07-31 RX ADMIN — PREDNISONE 50 MG: 50 TABLET ORAL at 21:32

## 2025-07-31 RX ADMIN — OXYCODONE HYDROCHLORIDE 5 MG: 5 TABLET ORAL at 16:25

## 2025-07-31 RX ADMIN — FLUTICASONE PROPIONATE 1 SPRAY: 50 SPRAY, METERED NASAL at 21:31

## 2025-07-31 RX ADMIN — ACETAMINOPHEN 975 MG: 325 TABLET ORAL at 23:29

## 2025-07-31 RX ADMIN — PREDNISONE 50 MG: 50 TABLET ORAL at 09:51

## 2025-07-31 RX ADMIN — DIPHENHYDRAMINE HYDROCHLORIDE 50 MG: 25 TABLET ORAL at 21:31

## 2025-07-31 SDOH — SOCIAL STABILITY: SOCIAL INSECURITY: ARE THERE ANY APPARENT SIGNS OF INJURIES/BEHAVIORS THAT COULD BE RELATED TO ABUSE/NEGLECT?: NO

## 2025-07-31 SDOH — SOCIAL STABILITY: SOCIAL INSECURITY
WITHIN THE LAST YEAR, HAVE YOU BEEN KICKED, HIT, SLAPPED, OR OTHERWISE PHYSICALLY HURT BY YOUR PARTNER OR EX-PARTNER?: NO

## 2025-07-31 SDOH — ECONOMIC STABILITY: INCOME INSECURITY: IN THE PAST 12 MONTHS HAS THE ELECTRIC, GAS, OIL, OR WATER COMPANY THREATENED TO SHUT OFF SERVICES IN YOUR HOME?: NO

## 2025-07-31 SDOH — SOCIAL STABILITY: SOCIAL INSECURITY: WITHIN THE LAST YEAR, HAVE YOU BEEN AFRAID OF YOUR PARTNER OR EX-PARTNER?: NO

## 2025-07-31 SDOH — ECONOMIC STABILITY: FOOD INSECURITY: WITHIN THE PAST 12 MONTHS, YOU WORRIED THAT YOUR FOOD WOULD RUN OUT BEFORE YOU GOT THE MONEY TO BUY MORE.: NEVER TRUE

## 2025-07-31 SDOH — SOCIAL STABILITY: SOCIAL INSECURITY
WITHIN THE LAST YEAR, HAVE YOU BEEN RAPED OR FORCED TO HAVE ANY KIND OF SEXUAL ACTIVITY BY YOUR PARTNER OR EX-PARTNER?: NO

## 2025-07-31 SDOH — ECONOMIC STABILITY: HOUSING INSECURITY: IN THE LAST 12 MONTHS, WAS THERE A TIME WHEN YOU WERE NOT ABLE TO PAY THE MORTGAGE OR RENT ON TIME?: NO

## 2025-07-31 SDOH — ECONOMIC STABILITY: HOUSING INSECURITY: IN THE PAST 12 MONTHS, HOW MANY TIMES HAVE YOU MOVED WHERE YOU WERE LIVING?: 1

## 2025-07-31 SDOH — SOCIAL STABILITY: SOCIAL INSECURITY: HAVE YOU HAD THOUGHTS OF HARMING ANYONE ELSE?: NO

## 2025-07-31 SDOH — SOCIAL STABILITY: SOCIAL INSECURITY: DO YOU FEEL ANYONE HAS EXPLOITED OR TAKEN ADVANTAGE OF YOU FINANCIALLY OR OF YOUR PERSONAL PROPERTY?: NO

## 2025-07-31 SDOH — SOCIAL STABILITY: SOCIAL INSECURITY: WITHIN THE LAST YEAR, HAVE YOU BEEN HUMILIATED OR EMOTIONALLY ABUSED IN OTHER WAYS BY YOUR PARTNER OR EX-PARTNER?: NO

## 2025-07-31 SDOH — ECONOMIC STABILITY: FOOD INSECURITY: WITHIN THE PAST 12 MONTHS, THE FOOD YOU BOUGHT JUST DIDN'T LAST AND YOU DIDN'T HAVE MONEY TO GET MORE.: NEVER TRUE

## 2025-07-31 SDOH — SOCIAL STABILITY: SOCIAL INSECURITY: WERE YOU ABLE TO COMPLETE ALL THE BEHAVIORAL HEALTH SCREENINGS?: YES

## 2025-07-31 SDOH — ECONOMIC STABILITY: FOOD INSECURITY: HOW HARD IS IT FOR YOU TO PAY FOR THE VERY BASICS LIKE FOOD, HOUSING, MEDICAL CARE, AND HEATING?: NOT HARD AT ALL

## 2025-07-31 SDOH — ECONOMIC STABILITY: HOUSING INSECURITY: AT ANY TIME IN THE PAST 12 MONTHS, WERE YOU HOMELESS OR LIVING IN A SHELTER (INCLUDING NOW)?: NO

## 2025-07-31 SDOH — SOCIAL STABILITY: SOCIAL INSECURITY: HAVE YOU HAD ANY THOUGHTS OF HARMING ANYONE ELSE?: NO

## 2025-07-31 SDOH — SOCIAL STABILITY: SOCIAL INSECURITY: ABUSE: ADULT

## 2025-07-31 SDOH — SOCIAL STABILITY: SOCIAL INSECURITY: DO YOU FEEL UNSAFE GOING BACK TO THE PLACE WHERE YOU ARE LIVING?: NO

## 2025-07-31 SDOH — SOCIAL STABILITY: SOCIAL INSECURITY: HAS ANYONE EVER THREATENED TO HURT YOUR FAMILY OR YOUR PETS?: NO

## 2025-07-31 SDOH — SOCIAL STABILITY: SOCIAL INSECURITY: ARE YOU OR HAVE YOU BEEN THREATENED OR ABUSED PHYSICALLY, EMOTIONALLY, OR SEXUALLY BY ANYONE?: NO

## 2025-07-31 SDOH — ECONOMIC STABILITY: TRANSPORTATION INSECURITY: IN THE PAST 12 MONTHS, HAS LACK OF TRANSPORTATION KEPT YOU FROM MEDICAL APPOINTMENTS OR FROM GETTING MEDICATIONS?: NO

## 2025-07-31 SDOH — SOCIAL STABILITY: SOCIAL INSECURITY: DOES ANYONE TRY TO KEEP YOU FROM HAVING/CONTACTING OTHER FRIENDS OR DOING THINGS OUTSIDE YOUR HOME?: NO

## 2025-07-31 ASSESSMENT — ENCOUNTER SYMPTOMS
HEADACHES: 0
ABDOMINAL DISTENTION: 0
BLOOD IN STOOL: 0
TROUBLE SWALLOWING: 0
DIFFICULTY URINATING: 0
WOUND: 1
HEMATURIA: 0
TREMORS: 0
VOMITING: 0
FREQUENCY: 0
ABDOMINAL PAIN: 0
COUGH: 0
DIARRHEA: 0
NAUSEA: 0
UNEXPECTED WEIGHT CHANGE: 0
SEIZURES: 0
CHILLS: 0
NECK PAIN: 0
CHEST TIGHTNESS: 0
SORE THROAT: 0
FACIAL ASYMMETRY: 0
SHORTNESS OF BREATH: 0
CHOKING: 0
FEVER: 0
NECK STIFFNESS: 0
CONFUSION: 0
APPETITE CHANGE: 0
LIGHT-HEADEDNESS: 1
SPEECH DIFFICULTY: 0
FLANK PAIN: 0
DIAPHORESIS: 0
PALPITATIONS: 0
BACK PAIN: 0
AGITATION: 0
WHEEZING: 0
CONSTIPATION: 0
JOINT SWELLING: 1
APNEA: 0
HALLUCINATIONS: 0
STRIDOR: 0

## 2025-07-31 ASSESSMENT — ACTIVITIES OF DAILY LIVING (ADL)
LACK_OF_TRANSPORTATION: NO
PATIENT'S MEMORY ADEQUATE TO SAFELY COMPLETE DAILY ACTIVITIES?: YES
DRESSING YOURSELF: NEEDS ASSISTANCE
LACK_OF_TRANSPORTATION: NO
HEARING - LEFT EAR: FUNCTIONAL
ADEQUATE_TO_COMPLETE_ADL: YES
BATHING: NEEDS ASSISTANCE
FEEDING YOURSELF: INDEPENDENT
WALKS IN HOME: NEEDS ASSISTANCE
TOILETING: NEEDS ASSISTANCE
ASSISTIVE_DEVICE: WALKER
GROOMING: NEEDS ASSISTANCE
HEARING - RIGHT EAR: FUNCTIONAL
JUDGMENT_ADEQUATE_SAFELY_COMPLETE_DAILY_ACTIVITIES: YES

## 2025-07-31 ASSESSMENT — PAIN DESCRIPTION - LOCATION
LOCATION: KNEE

## 2025-07-31 ASSESSMENT — LIFESTYLE VARIABLES
AUDIT-C TOTAL SCORE: 0
HAVE PEOPLE ANNOYED YOU BY CRITICIZING YOUR DRINKING: NO
HOW OFTEN DO YOU HAVE A DRINK CONTAINING ALCOHOL: NEVER
HAVE YOU EVER FELT YOU SHOULD CUT DOWN ON YOUR DRINKING: NO
AUDIT-C TOTAL SCORE: 0
TOTAL SCORE: 0
EVER FELT BAD OR GUILTY ABOUT YOUR DRINKING: NO
SKIP TO QUESTIONS 9-10: 1
EVER HAD A DRINK FIRST THING IN THE MORNING TO STEADY YOUR NERVES TO GET RID OF A HANGOVER: NO
HOW MANY STANDARD DRINKS CONTAINING ALCOHOL DO YOU HAVE ON A TYPICAL DAY: PATIENT DOES NOT DRINK
HOW OFTEN DO YOU HAVE 6 OR MORE DRINKS ON ONE OCCASION: NEVER

## 2025-07-31 ASSESSMENT — PAIN SCALES - GENERAL
PAINLEVEL_OUTOF10: 6
PAINLEVEL_OUTOF10: 8
PAINLEVEL_OUTOF10: 6
PAINLEVEL_OUTOF10: 8
PAINLEVEL_OUTOF10: 10 - WORST POSSIBLE PAIN
PAINLEVEL_OUTOF10: 10 - WORST POSSIBLE PAIN
PAINLEVEL_OUTOF10: 3
PAINLEVEL_OUTOF10: 8

## 2025-07-31 ASSESSMENT — PAIN - FUNCTIONAL ASSESSMENT
PAIN_FUNCTIONAL_ASSESSMENT: 0-10

## 2025-07-31 ASSESSMENT — COGNITIVE AND FUNCTIONAL STATUS - GENERAL
DAILY ACTIVITIY SCORE: 21
TOILETING: A LITTLE
STANDING UP FROM CHAIR USING ARMS: A LITTLE
MOVING FROM LYING ON BACK TO SITTING ON SIDE OF FLAT BED WITH BEDRAILS: A LITTLE
TURNING FROM BACK TO SIDE WHILE IN FLAT BAD: A LITTLE
MOBILITY SCORE: 17
PATIENT BASELINE BEDBOUND: NO
HELP NEEDED FOR BATHING: A LITTLE
MOVING TO AND FROM BED TO CHAIR: A LITTLE
DRESSING REGULAR LOWER BODY CLOTHING: A LITTLE
CLIMB 3 TO 5 STEPS WITH RAILING: A LOT
WALKING IN HOSPITAL ROOM: A LITTLE

## 2025-07-31 ASSESSMENT — PAIN DESCRIPTION - ORIENTATION
ORIENTATION: RIGHT

## 2025-07-31 ASSESSMENT — PATIENT HEALTH QUESTIONNAIRE - PHQ9
1. LITTLE INTEREST OR PLEASURE IN DOING THINGS: NOT AT ALL
2. FEELING DOWN, DEPRESSED OR HOPELESS: NOT AT ALL
SUM OF ALL RESPONSES TO PHQ9 QUESTIONS 1 & 2: 0

## 2025-07-31 NOTE — H&P
"History Of Present Illness  Wisam Corley is a 34 y.o. male with medical history of HLD, and recent knee surgery presented to [facility] on 7/31/2025 with complaint of syncope and chest pain. Patient denies recent fever/chills, cough, cold symptoms, palpitations, shortness of breath, abdominal pain, N/V/D, or urinary symptoms.  To note  Patient just had a right robotic total knee arthroplasty and right distal femur debridement of osteonecrotic lesion with application of allograft on 7/29/25 at [facility].  Patient was admitted to the hospital overnight at the clinic and then discharged home yesterday.    Patient has been taking his medications as prescribed including his aspirin.  Patient reports that he woke up around 5 AM with chest pain that was present only with inspiration.  Patient reports he got up to use the restroom with his walker and upon standing became lightheaded and had a syncopal episode.  His mom was present and witnessed the event.  Per the mother patient took 10 mg oxycodone \"a couple minutes\" prior to standing and walking.  When patient became lightheaded he started to fall backwards and his mom was able to help lower him to the floor.  Patient did not hit his head.  Mother reports patient was out for \"a couple seconds\".  Patient was not incontinent of bowel or bladder, did not bite tongue.  During my assessment patient denies lightheadedness, dizziness, chest pain, shortness of breath, and palpitations.  Patient is complaining of 10 out of 10 right knee pain.    In the ER lab work reveals glucose of 127, alk phos of 26, WBC of 17.8, hemoglobin of 12 and hematocrit of 36.  All other labs are unremarkable for acute process.  Trop negative x 2.  EKG reads normal sinus rhythm, rate 61, QTc of 0.39, no ST elevation noted.  Chest x-ray was done and was unremarkable.  X-ray of the right knee, right femur, and right tib-fib were done showing intact right knee arthroplasty with changes related to recent right " knee arthroplasty as described.  No acute fracture or dislocation of the right leg based on this exam.  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      Past Medical History  Medical History[1]    Surgical History  Surgical History[2]     Social History  Social History[3]     Family History  Family History[4]     Allergies  Fish containing products, Iodine, Shellfish containing products, and Shrimp    Review of Systems   Constitutional:  Negative for appetite change, chills, diaphoresis, fever and unexpected weight change.   HENT:  Positive for congestion. Negative for sneezing, sore throat and trouble swallowing.    Eyes:  Negative for visual disturbance.   Respiratory:  Negative for apnea, cough, choking, chest tightness, shortness of breath, wheezing and stridor.         Chest pain with inspiration   Cardiovascular:  Positive for chest pain and leg swelling. Negative for palpitations.        Chest pain with inspiration   Gastrointestinal:  Negative for abdominal distention, abdominal pain, blood in stool, constipation, diarrhea, nausea and vomiting.   Genitourinary:  Negative for difficulty urinating, flank pain, frequency and hematuria.   Musculoskeletal:  Positive for gait problem and joint swelling. Negative for back pain, neck pain and neck stiffness.        Right knee pain due to recent surgery, wound VAC in place to right knee, edema noted to right lower extremity   Skin:  Positive for pallor and wound. Negative for rash.        Right knee with wound VAC in place   Neurological:  Positive for syncope and light-headedness. Negative for tremors, seizures, facial asymmetry, speech difficulty and headaches.   Psychiatric/Behavioral:  Negative for agitation, confusion and hallucinations.        Physical Exam  Constitutional:       General: He is not in acute distress.      "Appearance: He is normal weight. He is not diaphoretic.   HENT:      Head: Normocephalic and atraumatic.      Nose: Nose normal.      Mouth/Throat:      Mouth: Mucous membranes are moist.      Pharynx: Oropharynx is clear.     Eyes:      Conjunctiva/sclera: Conjunctivae normal.       Cardiovascular:      Rate and Rhythm: Normal rate and regular rhythm.      Pulses: Normal pulses.      Heart sounds: No murmur heard.  Pulmonary:      Effort: Pulmonary effort is normal. No respiratory distress.      Breath sounds: Normal breath sounds. No stridor. No wheezing, rhonchi or rales.   Chest:      Chest wall: No tenderness.   Abdominal:      General: Abdomen is flat. There is no distension.      Palpations: Abdomen is soft.      Tenderness: There is no guarding or rebound.     Musculoskeletal:         General: Swelling and tenderness present.      Cervical back: Neck supple. No rigidity or tenderness.      Right lower leg: Edema present.     Skin:     General: Skin is warm and dry.      Capillary Refill: Capillary refill takes 2 to 3 seconds.      Coloration: Skin is pale.     Neurological:      General: No focal deficit present.      Mental Status: He is alert and oriented to person, place, and time.     Psychiatric:         Mood and Affect: Mood normal.         Behavior: Behavior normal.         Last Recorded Vitals  Visit Vitals  /70 (BP Location: Right arm, Patient Position: Lying)   Pulse 64   Temp 36.6 °C (97.9 °F) (Temporal)   Resp 18   Ht 1.778 m (5' 10\")   Wt 102 kg (225 lb 5 oz)   SpO2 93%   BMI 32.33 kg/m²   Smoking Status Former   BSA 2.24 m²        Relevant Results  Results for orders placed or performed during the hospital encounter of 07/31/25 (from the past 24 hours)   ECG 12 lead   Result Value Ref Range    Ventricular Rate 74 BPM    Atrial Rate 74 BPM    OH Interval 176 ms    QRS Duration 104 ms    QT Interval 374 ms    QTC Calculation(Bazett) 415 ms    P Axis 23 degrees    R Axis 68 degrees    T Axis " 44 degrees    QRS Count 12 beats    Q Onset 223 ms    P Onset 135 ms    P Offset 186 ms    T Offset 410 ms    QTC Fredericia 401 ms   CBC and Auto Differential   Result Value Ref Range    WBC 17.8 (H) 4.4 - 11.3 x10*3/uL    nRBC 0.0 0.0 - 0.0 /100 WBCs    RBC 3.99 (L) 4.50 - 5.90 x10*6/uL    Hemoglobin 12.0 (L) 13.5 - 17.5 g/dL    Hematocrit 36.6 (L) 41.0 - 52.0 %    MCV 92 80 - 100 fL    MCH 30.1 26.0 - 34.0 pg    MCHC 32.8 32.0 - 36.0 g/dL    RDW 12.0 11.5 - 14.5 %    Platelets 308 150 - 450 x10*3/uL    Neutrophils % 77.7 40.0 - 80.0 %    Immature Granulocytes %, Automated 0.6 0.0 - 0.9 %    Lymphocytes % 11.9 13.0 - 44.0 %    Monocytes % 8.6 2.0 - 10.0 %    Eosinophils % 0.9 0.0 - 6.0 %    Basophils % 0.3 0.0 - 2.0 %    Neutrophils Absolute 13.80 (H) 1.20 - 7.70 x10*3/uL    Immature Granulocytes Absolute, Automated 0.10 0.00 - 0.70 x10*3/uL    Lymphocytes Absolute 2.12 1.20 - 4.80 x10*3/uL    Monocytes Absolute 1.52 (H) 0.10 - 1.00 x10*3/uL    Eosinophils Absolute 0.16 0.00 - 0.70 x10*3/uL    Basophils Absolute 0.05 0.00 - 0.10 x10*3/uL   Comprehensive Metabolic Panel   Result Value Ref Range    Glucose 127 (H) 74 - 99 mg/dL    Sodium 139 136 - 145 mmol/L    Potassium 3.6 3.5 - 5.3 mmol/L    Chloride 101 98 - 107 mmol/L    Bicarbonate 31 21 - 32 mmol/L    Anion Gap 11 10 - 20 mmol/L    Urea Nitrogen 14 6 - 23 mg/dL    Creatinine 0.85 0.50 - 1.30 mg/dL    eGFR >90 >60 mL/min/1.73m*2    Calcium 8.7 8.6 - 10.3 mg/dL    Albumin 4.1 3.4 - 5.0 g/dL    Alkaline Phosphatase 26 (L) 33 - 120 U/L    Total Protein 7.0 6.4 - 8.2 g/dL    AST 13 9 - 39 U/L    Bilirubin, Total 0.3 0.0 - 1.2 mg/dL    ALT 11 10 - 52 U/L   Magnesium   Result Value Ref Range    Magnesium 1.87 1.60 - 2.40 mg/dL   Troponin I, High Sensitivity, Initial   Result Value Ref Range    Troponin I, High Sensitivity 6 0 - 20 ng/L   ECG 12 lead   Result Value Ref Range    Ventricular Rate 61 BPM    Atrial Rate 61 BPM    LA Interval 172 ms    QRS Duration 100 ms     QT Interval 390 ms    QTC Calculation(Bazett) 392 ms    P Axis -11 degrees    R Axis 60 degrees    T Axis 41 degrees    QRS Count 10 beats    Q Onset 224 ms    P Onset 138 ms    P Offset 192 ms    T Offset 419 ms    QTC Fredericia 391 ms   Light Blue Top   Result Value Ref Range    Extra Tube Hold for add-ons.    Troponin, High Sensitivity, 1 Hour   Result Value Ref Range    Troponin I, High Sensitivity 6 0 - 20 ng/L      Imaging:  Vascular US lower extremity venous duplex right   Final Result      XR chest 1 view   Final Result   Negative exam.        MACRO:   None        Signed by: Hardeep Abdalla 7/31/2025 9:14 AM   Dictation workstation:   [id number]      XR knee right 4+ views   Final Result   Intact right knee arthroplasty with changes related to recent right   knee arthroplasty as described.        No acute fracture or dislocation in the right leg based on this exam.        MACRO:   None        Signed by: Hardeep Abdalla 7/31/2025 9:19 AM   Dictation workstation:   [id number]      XR femur right 2+ views   Final Result   Intact right knee arthroplasty with changes related to recent right   knee arthroplasty as described.        No acute fracture or dislocation in the right leg based on this exam.        MACRO:   None        Signed by: Hardeep Abdalla 7/31/2025 9:19 AM   Dictation workstation:   [id number]      XR tibia fibula right 2 views   Final Result   Intact right knee arthroplasty with changes related to recent right   knee arthroplasty as described.        No acute fracture or dislocation in the right leg based on this exam.        MACRO:   None        Signed by: Hardeep Abdalla 7/31/2025 9:19 AM   Dictation workstation:   [id number]      CT angio chest for pulmonary embolism    (Results Pending)       EKG:  Encounter Date: 07/31/25   ECG 12 lead   Result Value    Ventricular Rate 61    Atrial Rate 61    PA Interval 172    QRS Duration 100    QT Interval 390    QTC Calculation(Bazett) 392    P Axis -11     R Axis 60    T Axis 41    QRS Count 10    Q Onset 224    P Onset 138    P Offset 192    T Offset 419    QTC Fredericia 391    Narrative    Normal sinus rhythm  Normal ECG  When compared with ECG of 31-JUL-2025 07:37, (unconfirmed)  No significant change was found     Echo:  No results found for this or any previous visit.    Home Medications  Prior to Admission medications   Medication Sig Start Date End Date Taking? Authorizing Provider   acetaminophen (Tylenol) 500 mg tablet Take 2 tablets (1,000 mg) by mouth every 8 hours if needed for mild pain (1 - 3).   Yes Historical Provider, MD   ascorbic acid (Vitamin C) 500 mg tablet Take 1 tablet (500 mg) by mouth 2 times a day. For 27 doses 7/30/25  Yes Historical Provider, MD   aspirin 81 mg EC tablet Take 1 tablet (81 mg) by mouth 2 times a day. For 28 days 7/30/25  Yes Historical Provider, MD   biotin 10,000 mcg capsule Take 1 capsule (10 mg) by mouth once daily.   Yes Historical Provider, MD   cefadroxil (Duricef) 500 mg capsule Take 1 capsule (500 mg) by mouth 2 times a day. 7/30/25 8/6/25 Yes Historical Provider, MD   cyanocobalamin (Vitamin B-12) 1,000 mcg tablet Take 1 tablet (1,000 mcg) by mouth once daily.   Yes Historical Provider, MD   fluticasone (Flonase) 50 mcg/actuation nasal spray Administer into affected nostril(s) every 12 hours.   Yes Historical Provider, MD   meloxicam (Mobic) 15 mg tablet Take 1 tablet (15 mg) by mouth once daily. 7/30/25 8/13/25 Yes Historical Provider, MD   multivitamin tablet Take 1 tablet by mouth once daily.   Yes Historical Provider, MD   oxyCODONE (Roxicodone) 5 mg immediate release tablet Take 1-2 tablets (5-10 mg) by mouth every 6 hours if needed for severe pain (7 - 10). 7/30/25 8/6/25 Yes Historical Provider, MD   pantoprazole (ProtoNix) 20 mg EC tablet Take 1 tablet (20 mg) by mouth once daily in the morning. Take before meals. Do not crush, chew, or split.   Yes Historical Provider, MD   rosuvastatin (Crestor) 5 mg  tablet Take 1 tablet (5 mg) by mouth once daily. 6/4/25 12/1/25 Yes Sal Luis DO   tadalafil (Cialis) 5 mg tablet Take 1 tablet (5 mg) by mouth once daily. 9/4/24  Yes Porsha Cook MD   topiramate (Topamax) 25 mg tablet Take 1 tablet (25 mg) by mouth 2 times a day. 6/4/25 12/1/25 Yes Sal Luis DO   albuterol sulfate (Proair Digihaler) 90 mcg/actuation aero powdr breath act w/sensor inhaler Inhale 1 puff every 4 hours if needed for wheezing.    Historical Provider, MD   docusate sodium (Colace) 100 mg capsule Take 1 capsule (100 mg) by mouth 2 times a day as needed for constipation.    Historical Provider, MD   naproxen (Naprosyn) 500 mg tablet Take 1 tablet (500 mg) by mouth 2 times daily (morning and late afternoon).  Patient not taking: Reported on 7/31/2025 1/8/25   Alex Shaikh MD   polyethylene glycol (Glycolax, Miralax) 17 gram packet Take 17 g by mouth once daily as needed.    Historical Provider, MD   B complex-vitamin C-folic acid (Nephro-Suzanna Rx) 1- mg-mg-mcg tablet Take 1 tablet by mouth once daily with breakfast.  7/31/25  Historical Provider, MD   loratadine (Claritin) 10 mg tablet Take 1 tablet (10 mg) by mouth once daily.  7/31/25  Historical Provider, MD       Medications  Scheduled medications  Scheduled Medications[5]  Continuous medications  Continuous Medications[6]  PRN medications  acetaminophen, 975 mg, q8h PRN  docusate sodium, 100 mg, BID PRN  melatonin, 3 mg, Nightly PRN  oxyCODONE, 5 mg, q4h PRN  polyethylene glycol, 17 g, Daily PRN        Assessment/Plan   Assessment & Plan  Chest pain in adult  Syncopal episodes    Plan:   - Patient with a recent surgery need to rule out PE, CTA of chest ordered but patient with shellfish allergy reaction, is anaphylaxis patient will be premedicated with prednisone and plan for scan at 10:30 PM tonight  - Therapeutic Lovenox until PE is ruled out, patient's postop aspirin twice daily is on hold while on Lovenox this  will need resumed if Lovenox is discontinued  -Continue home medications as appropriate  -Patient was on antibiotics preadmission which I have continued  - Tylenol, Colace, melatonin, oxycodone, and MiraLAX as needed  - Regular diet  - BMP, CBC in a.m.  - WBC elevated but no sign of infection at this time.  No fever.  Likely reactive due to recent surgery.  Will recheck CBC in a.m. tomorrow.  - SCD for VTE prophylaxis  - Vitals every 4 hour   -cardiac monitoring  -orthostatic vitals   - Patient had taken 10 mg of oxycodone prior to his syncopal episode which may have contributed         VTE prophylaxis:   DVT prophylaxis: subcutaneous Lovenox and Therapeutic anticoagulation      See additional orders for further plan of care.   Further evaluation and management per attending and consulting physicians.      Code Status  Full code        Complexity: moderate     Zuleima Bonds APRN-CNP  [facility]                 [1]   Past Medical History:  Diagnosis Date    HLD (hyperlipidemia)    [2]   Past Surgical History:  Procedure Laterality Date    KNEE SURGERY Right     OTHER SURGICAL HISTORY  2022    Hand surgery    OTHER SURGICAL HISTORY  2022    Hernia repair   [3]   Social History  Tobacco Use    Smoking status: Former     Current packs/day: 0.00     Types: Cigarettes     Quit date:      Years since quittin.5     Passive exposure: Past    Smokeless tobacco: Former     Types: Chew   Vaping Use    Vaping status: Never Used   Substance Use Topics    Alcohol use: Not Currently     Comment: once monthly    Drug use: Never   [4]   Family History  Problem Relation Name Age of Onset    No Known Problems Mother      No Known Problems Father     [5] ascorbic acid, 500 mg, oral, BID  [Held by provider] aspirin, 81 mg, oral, BID  cefadroxil, 500 mg, oral, BID  cyanocobalamin, 1,000 mcg, oral, Daily  diphenhydrAMINE, 50 mg, oral, Once  enoxaparin, 1 mg/kg, subcutaneous, q12h  fluticasone, 1 spray, Each Nostril,  BID  [Held by provider] meloxicam, 15 mg, oral, Daily  [START ON 8/1/2025] pantoprazole, 20 mg, oral, Daily before breakfast  predniSONE, 50 mg, oral, q6h  rosuvastatin, 5 mg, oral, Nightly  topiramate, 25 mg, oral, BID     [6]

## 2025-07-31 NOTE — ASSESSMENT & PLAN NOTE
Plan:   - Patient with a recent surgery need to rule out PE, CTA of chest ordered but patient with shellfish allergy reaction, is anaphylaxis patient will be premedicated with prednisone and plan for scan at 10:30 PM tonight  - Therapeutic Lovenox until PE is ruled out, patient's postop aspirin twice daily is on hold while on Lovenox this will need resumed if Lovenox is discontinued  -Continue home medications as appropriate  -Patient was on antibiotics preadmission which I have continued  - Tylenol, Colace, melatonin, oxycodone, and MiraLAX as needed  - Regular diet  - BMP, CBC in a.m.  - WBC elevated but no sign of infection at this time.  No fever.  Likely reactive due to recent surgery.  Will recheck CBC in a.m. tomorrow.  - SCD for VTE prophylaxis  - Vitals every 4 hour   -cardiac monitoring  -orthostatic vitals   - Patient had taken 10 mg of oxycodone prior to his syncopal episode which may have contributed

## 2025-07-31 NOTE — ED TRIAGE NOTES
Pt was brought to ED via EMS with c/o chest pain and syncope. Pt had a knee replacement on his right knee two days ago. He reports that he woke up this morning with chest pain with inspiration. Pt had a witnessed syncopal episode where he fell to the floor. Pt is complaining of 10/10 right knee pain.     EMS administered 324 ASA and 50 mcg of Fentanyl in route to ED.

## 2025-07-31 NOTE — ED PROVIDER NOTES
Emergency Department Provider Note       History of Present Illness     History provided by: Patient  Limitations to History: None  External Records Reviewed with Brief Summary: None    HPI:  Wisam Corley is a 34 y.o. male who had a right total knee arthroplasty at [facility] 2 days ago.  He was discharged with Percocet and Tylenol.  He awakened this morning at his normal time and was feeling generally unwell with some discomfort in his mid chest.  He had syncopal event on his way to the bathroom after taking 2 Percocet this morning.  He is complaining of severe pain at his surgical site.  He has no head injury does not think he hit his head.  No LOC.  No witnessed seizure activity.  Did not bite his tongue and no urinary incontinence.  Has no history of seizures.  He does have a history of high cholesterol but is never had any history of arrhythmia or cardiovascular disease diagnosis.  He does not have any headache or focal neurologic deficits.  He denies any tingling.  Denies any vomiting or diarrhea.  States has been eating and drinking normally.  Chest pain is midsternal does not radiate and is pleuritic in nature.  He does not have a history of a coagulopathy and is not on anticoagulation at present.  He was given fentanyl 50 mcg in the squad prior to arrival with marginal improvement in his leg pain symptoms    Physical Exam   Triage vitals:  T 36.7 °C (98.1 °F)  HR 77  BP (!) 151/94  RR 15  O2 96 % None (Room air)    Physical Exam  Vitals and nursing note reviewed.   Constitutional:       Appearance: He is well-developed.   HENT:      Head: Normocephalic and atraumatic.     Eyes:      Pupils: Pupils are equal, round, and reactive to light.       Cardiovascular:      Rate and Rhythm: Normal rate and regular rhythm.      Heart sounds: Normal heart sounds.   Pulmonary:      Effort: Pulmonary effort is normal. No tachypnea or accessory muscle usage.      Breath sounds: Normal breath sounds.    Abdominal:      Palpations: Abdomen is soft.     Musculoskeletal:      Cervical back: Normal range of motion.      Right lower leg: Tenderness present. Edema present.      Comments: Incision closed with dressing, no erythema in the visualized portion of the skin overlying the right knee     Skin:     Capillary Refill: Capillary refill takes less than 2 seconds.     Neurological:      General: No focal deficit present.      Mental Status: He is alert.           Medical Decision Making & ED Course   Medical Decision Makin y.o. male with syncopal event.  Symptoms do not seem strongly suggestive of a cardiac or neurologic event.  Consider medication use as part of his differential.  He does have pleuritic chest pain and has a history of anaphylaxis to shellfish in the past therefore cannot safely be CTA done until he has had premedication.  Premedication started orally in the emergency department on 13-hour protocol.  Patient will be admitted observation pending completion of his protocol and review of CTA.  D-dimer was not obtained due to his recent surgery and DVT scan was negative on the left leg.  X-rays are unremarkable he is not fractured periprosthetically.  He has no evidence of heart failure and no STEMI.  Care plan discussed with Dr. Wolfe who agrees to observe patient with the above care plan.  ----      Differential diagnoses considered include but are not limited to: Noted above    Social Determinants of Health which Significantly Impact Care: Social Determinants of Health which Significantly Impact Care: None identified     EKG Independent Interpretation: The EKG obtained at 9:06 AM was independently interpreted by myself. It demonstrates normal sinus rhythm with a ventricular rate of 61.  Normal axis. Intervals normal. ST segments showed no elevation.     Independent Result Review and Interpretation: Relevant laboratory and radiographic results were reviewed and independently interpreted by  myself.  As necessary, they are commented on in the ED Course.    Chronic conditions affecting the patient's care: As documented above in [facility]    The patient was discussed with the following consultants/services: Dr. Wolfe    Care Considerations: As documented above in [facility]    ED Course:  Diagnoses as of 07/31/25 1213   Chest pain, unspecified type   Syncope and collapse   Pleuritic chest pain       Disposition   As a result of their workup, the patient will require admission to the hospital.  The patient was informed of his diagnosis.  The patient was given the opportunity to ask questions and I answered them. The patient agreed to be admitted to the hospital.    Procedures   Procedures        Mirela Bradley MD  Emergency Medicine                                                       Mirela Bradley MD  07/31/25 1221

## 2025-08-01 ENCOUNTER — APPOINTMENT (OUTPATIENT)
Dept: CARDIOLOGY | Facility: HOSPITAL | Age: 35
End: 2025-08-01
Payer: COMMERCIAL

## 2025-08-01 VITALS
HEART RATE: 65 BPM | HEIGHT: 70 IN | WEIGHT: 225.31 LBS | SYSTOLIC BLOOD PRESSURE: 145 MMHG | TEMPERATURE: 98.2 F | OXYGEN SATURATION: 100 % | DIASTOLIC BLOOD PRESSURE: 80 MMHG | RESPIRATION RATE: 20 BRPM | BODY MASS INDEX: 32.26 KG/M2

## 2025-08-01 LAB
ANION GAP SERPL CALC-SCNC: 14 MMOL/L (ref 10–20)
ATRIAL RATE: 89 BPM
ATRIAL RATE: 97 BPM
BUN SERPL-MCNC: 13 MG/DL (ref 6–23)
CALCIUM SERPL-MCNC: 9.5 MG/DL (ref 8.6–10.3)
CARDIAC TROPONIN I PNL SERPL HS: <3 NG/L (ref 0–20)
CHLORIDE SERPL-SCNC: 100 MMOL/L (ref 98–107)
CO2 SERPL-SCNC: 29 MMOL/L (ref 21–32)
CREAT SERPL-MCNC: 0.57 MG/DL (ref 0.5–1.3)
EGFRCR SERPLBLD CKD-EPI 2021: >90 ML/MIN/1.73M*2
ERYTHROCYTE [DISTWIDTH] IN BLOOD BY AUTOMATED COUNT: 12.1 % (ref 11.5–14.5)
GLUCOSE SERPL-MCNC: 128 MG/DL (ref 74–99)
HCT VFR BLD AUTO: 35.4 % (ref 41–52)
HGB BLD-MCNC: 11.6 G/DL (ref 13.5–17.5)
HOLD SPECIMEN: NORMAL
LDLC SERPL DIRECT ASSAY-MCNC: 115 MG/DL (ref 0–129)
MCH RBC QN AUTO: 30.7 PG (ref 26–34)
MCHC RBC AUTO-ENTMCNC: 32.8 G/DL (ref 32–36)
MCV RBC AUTO: 94 FL (ref 80–100)
NRBC BLD-RTO: 0 /100 WBCS (ref 0–0)
P AXIS: 25 DEGREES
P AXIS: 52 DEGREES
P OFFSET: 183 MS
P OFFSET: 184 MS
P ONSET: 126 MS
P ONSET: 127 MS
PLATELET # BLD AUTO: 337 X10*3/UL (ref 150–450)
POTASSIUM SERPL-SCNC: 3.7 MMOL/L (ref 3.5–5.3)
PR INTERVAL: 178 MS
PR INTERVAL: 180 MS
Q ONSET: 216 MS
Q ONSET: 216 MS
QRS COUNT: 15 BEATS
QRS COUNT: 15 BEATS
QRS DURATION: 102 MS
QRS DURATION: 104 MS
QT INTERVAL: 366 MS
QT INTERVAL: 376 MS
QTC CALCULATION(BAZETT): 457 MS
QTC CALCULATION(BAZETT): 464 MS
QTC FREDERICIA: 428 MS
QTC FREDERICIA: 429 MS
R AXIS: 33 DEGREES
R AXIS: 34 DEGREES
RBC # BLD AUTO: 3.78 X10*6/UL (ref 4.5–5.9)
SODIUM SERPL-SCNC: 139 MMOL/L (ref 136–145)
T AXIS: 22 DEGREES
T AXIS: 40 DEGREES
T OFFSET: 399 MS
T OFFSET: 404 MS
VENTRICULAR RATE: 89 BPM
VENTRICULAR RATE: 97 BPM
WBC # BLD AUTO: 23.8 X10*3/UL (ref 4.4–11.3)

## 2025-08-01 PROCEDURE — 2500000002 HC RX 250 W HCPCS SELF ADMINISTERED DRUGS (ALT 637 FOR MEDICARE OP, ALT 636 FOR OP/ED): Performed by: NURSE PRACTITIONER

## 2025-08-01 PROCEDURE — 96360 HYDRATION IV INFUSION INIT: CPT | Mod: 59

## 2025-08-01 PROCEDURE — 2500000001 HC RX 250 WO HCPCS SELF ADMINISTERED DRUGS (ALT 637 FOR MEDICARE OP): Performed by: NURSE PRACTITIONER

## 2025-08-01 PROCEDURE — 99232 SBSQ HOSP IP/OBS MODERATE 35: CPT

## 2025-08-01 PROCEDURE — 93005 ELECTROCARDIOGRAM TRACING: CPT

## 2025-08-01 PROCEDURE — 84484 ASSAY OF TROPONIN QUANT: CPT

## 2025-08-01 PROCEDURE — 2500000004 HC RX 250 GENERAL PHARMACY W/ HCPCS (ALT 636 FOR OP/ED)

## 2025-08-01 PROCEDURE — 85027 COMPLETE CBC AUTOMATED: CPT | Performed by: NURSE PRACTITIONER

## 2025-08-01 PROCEDURE — 83721 ASSAY OF BLOOD LIPOPROTEIN: CPT | Mod: STJLAB

## 2025-08-01 PROCEDURE — 36415 COLL VENOUS BLD VENIPUNCTURE: CPT

## 2025-08-01 PROCEDURE — 36415 COLL VENOUS BLD VENIPUNCTURE: CPT | Performed by: NURSE PRACTITIONER

## 2025-08-01 PROCEDURE — 80048 BASIC METABOLIC PNL TOTAL CA: CPT | Performed by: NURSE PRACTITIONER

## 2025-08-01 PROCEDURE — 96372 THER/PROPH/DIAG INJ SC/IM: CPT | Performed by: NURSE PRACTITIONER

## 2025-08-01 PROCEDURE — 96361 HYDRATE IV INFUSION ADD-ON: CPT

## 2025-08-01 PROCEDURE — 2500000004 HC RX 250 GENERAL PHARMACY W/ HCPCS (ALT 636 FOR OP/ED): Performed by: NURSE PRACTITIONER

## 2025-08-01 PROCEDURE — G0378 HOSPITAL OBSERVATION PER HR: HCPCS

## 2025-08-01 PROCEDURE — 2500000001 HC RX 250 WO HCPCS SELF ADMINISTERED DRUGS (ALT 637 FOR MEDICARE OP): Performed by: STUDENT IN AN ORGANIZED HEALTH CARE EDUCATION/TRAINING PROGRAM

## 2025-08-01 RX ADMIN — POLYETHYLENE GLYCOL 3350 17 G: 17 POWDER, FOR SOLUTION ORAL at 08:27

## 2025-08-01 RX ADMIN — FLUTICASONE PROPIONATE 1 SPRAY: 50 SPRAY, METERED NASAL at 10:52

## 2025-08-01 RX ADMIN — OXYCODONE HYDROCHLORIDE 5 MG: 5 TABLET ORAL at 14:18

## 2025-08-01 RX ADMIN — OXYCODONE HYDROCHLORIDE 5 MG: 5 TABLET ORAL at 08:27

## 2025-08-01 RX ADMIN — OXYCODONE HYDROCHLORIDE AND ACETAMINOPHEN 500 MG: 500 TABLET ORAL at 08:27

## 2025-08-01 RX ADMIN — ENOXAPARIN SODIUM 100 MG: 100 INJECTION SUBCUTANEOUS at 13:06

## 2025-08-01 RX ADMIN — CEFADROXIL 500 MG: 500 CAPSULE ORAL at 08:27

## 2025-08-01 RX ADMIN — TOPIRAMATE 25 MG: 25 TABLET, FILM COATED ORAL at 08:27

## 2025-08-01 RX ADMIN — SODIUM CHLORIDE 1000 ML: 0.9 INJECTION, SOLUTION INTRAVENOUS at 10:45

## 2025-08-01 RX ADMIN — Medication 1000 MCG: at 08:27

## 2025-08-01 RX ADMIN — ENOXAPARIN SODIUM 100 MG: 100 INJECTION SUBCUTANEOUS at 01:45

## 2025-08-01 RX ADMIN — PANTOPRAZOLE SODIUM 20 MG: 20 TABLET, DELAYED RELEASE ORAL at 05:51

## 2025-08-01 ASSESSMENT — PAIN DESCRIPTION - LOCATION
LOCATION: KNEE
LOCATION: KNEE

## 2025-08-01 ASSESSMENT — PAIN - FUNCTIONAL ASSESSMENT
PAIN_FUNCTIONAL_ASSESSMENT: 0-10

## 2025-08-01 ASSESSMENT — PAIN SCALES - GENERAL
PAINLEVEL_OUTOF10: 4
PAINLEVEL_OUTOF10: 8
PAINLEVEL_OUTOF10: 7
PAINLEVEL_OUTOF10: 6
PAINLEVEL_OUTOF10: 8

## 2025-08-01 ASSESSMENT — COGNITIVE AND FUNCTIONAL STATUS - GENERAL
TOILETING: A LITTLE
MOVING TO AND FROM BED TO CHAIR: A LITTLE
DRESSING REGULAR LOWER BODY CLOTHING: A LITTLE
DAILY ACTIVITIY SCORE: 22
MOBILITY SCORE: 21
WALKING IN HOSPITAL ROOM: A LITTLE
CLIMB 3 TO 5 STEPS WITH RAILING: A LITTLE

## 2025-08-01 ASSESSMENT — PAIN DESCRIPTION - ORIENTATION
ORIENTATION: RIGHT
ORIENTATION: RIGHT

## 2025-08-01 ASSESSMENT — ACTIVITIES OF DAILY LIVING (ADL): LACK_OF_TRANSPORTATION: NO

## 2025-08-01 NOTE — NURSING NOTE
Dc instructions given to pt, verbalized understanding. No new prescriptions. Pt going home, waiting for ride

## 2025-08-01 NOTE — DISCHARGE SUMMARY
Discharge Diagnosis  Pleuritic chest pain       Issues Requiring Follow-Up  CT chest in 3 month    Key Medication Changes:   None    Test Results Pending At Discharge  CT chest in 3 month        Hospital Course/Assessment & Plan:    Wisam Corley is a 34 y.o. male with medical history of HLD, and recent knee surgery presented to [facility] on 7/31/2025 with complaint of syncope and chest pain.      Chest pain in adult  Syncopal episodes  Wisam Corley is a 34 y.o. male with medical history of HLD, and recent knee surgery presented to [facility] on 7/31/2025 with complaint of syncope and chest pain.  That morning he had a syncopal episode shortly after taking oxycodone, and accompanied by pleuritic chest pain.   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 Given high suspicion for PE, was tx here with therapeutic Lovenox 1 mg/kg twice daily.  Was noticed to have periods of tachycardia, heart rate up to 115.  Normal saline 1 L given with heart rate improvement to 80s.  EKG this morning showed normal sinus rhythm with incomplete right bundle branch block, EKG was sent to cardiology,  Dr Tee for a review. Repeat EKG Normal sinus rhythm with  no incomplete BBB. Troponin this admission negative x 3.     CT Chest revealed:   1. No evidence for central pulmonary arterial embolism. Timing of the   contrast bolus limits detailed assessment for distal pulmonary   arterial embolism.   2. Nonspecific scattered areas of increased linear density with   subtle adjacent ground-glass opacity bilaterally, most prominent   within the lung apices and the superior segments of bilateral lower   lobes. This could represent chronic change/scar. A degree of   atelectasis or mild inflammatory/infectious pneumonitis can not be   excluded. No comparison imaging available.  Follow-up imaging advised   to document resolution or stability.        Discharge Meds     Medication List      CONTINUE taking these medications     acetaminophen 500 mg tablet; Commonly known as: Tylenol   albuterol sulfate 90 mcg/actuation aero powdr breath act w/sensor   inhaler; Commonly known as: Proair Digihaler   ascorbic acid 500 mg tablet; Commonly known as: Vitamin C   aspirin 81 mg EC tablet   biotin 10,000 mcg capsule   cefadroxil 500 mg capsule; Commonly known as: Duricef   cyanocobalamin 1,000 mcg tablet; Commonly known as: Vitamin B-12   docusate sodium 100 mg capsule; Commonly known as: Colace   fluticasone 50 mcg/actuation nasal spray; Commonly known as: Flonase   meloxicam 15 mg tablet; Commonly known as: Mobic   multivitamin tablet   oxyCODONE 5 mg immediate release tablet; Commonly known as: Roxicodone   pantoprazole 20 mg EC tablet; Commonly known as: ProtoNix   polyethylene glycol 17 gram packet; Commonly known as: Glycolax, Miralax   rosuvastatin 5 mg tablet; Commonly known as: Crestor; Take 1 tablet (5   mg) by mouth once daily.   tadalafil 5 mg tablet; Commonly known as: Cialis; Take 1 tablet (5 mg)   by mouth once daily.   topiramate 25 mg tablet; Commonly known as: Topamax; Take 1 tablet (25   mg) by mouth 2 times a day.     STOP taking these medications     naproxen 500 mg tablet; Commonly known as: Naprosyn       Test Results Pending At Discharge  Pending Labs       Order Current Status    LDL Cholesterol, Direct In process                Pertinent Physical Exam At Time of Discharge  Physical Exam  Constitutional:       Appearance: Normal appearance.   HENT:      Head: Normocephalic and atraumatic.      Right Ear: External ear normal.      Left Ear: External ear normal.      Nose: Nose normal.      Mouth/Throat:      Mouth: Mucous membranes are moist.     Eyes:      Extraocular Movements: Extraocular movements intact.      Conjunctiva/sclera: Conjunctivae normal.      Pupils: Pupils are  equal, round, and reactive to light.       Cardiovascular:      Rate and Rhythm: Normal rate and regular rhythm.   Pulmonary:      Effort: Pulmonary effort is normal.      Breath sounds: Normal breath sounds.   Abdominal:      Palpations: Abdomen is soft.     Musculoskeletal:      Cervical back: Normal range of motion and neck supple.     Skin:     General: Skin is warm and dry.      Capillary Refill: Capillary refill takes less than 2 seconds.     Neurological:      General: No focal deficit present.      Mental Status: He is alert and oriented to person, place, and time.     Psychiatric:         Mood and Affect: Mood normal.         Behavior: Behavior normal.         Thought Content: Thought content normal.         Judgment: Judgment normal.         Outpatient Follow-Up  Future Appointments   Date Time Provider Department Center   9/4/2025  1:00 PM Sal Luis [address]     Follow-up with PCP and orthopedics as scheduled or sooner if needed.    I spent a total of 37 minutes on the date of the service which included preparing to see the patient, face-to-face patient care, completing clinical documentation, obtaining and/or reviewing separately obtained history, performing a medically appropriate examination, counseling and educating the patient/family/caregiver, ordering medications, tests, or procedures, communicating with other HCPs (not separately reported), independently interpreting results (not separately reported), communicating results to the patient/family/caregiver, and care coordination (not separately reported).     GARY Conteh-CNP

## 2025-08-01 NOTE — CARE PLAN
The patient's goals for the shift include adequate rest and decreased pain    The clinical goals for the shift include

## 2025-08-01 NOTE — PROGRESS NOTES
08/01/25 1113   Discharge Planning   Living Arrangements Alone   Support Systems Family members;Parent   Assistance Needed none   Type of Residence Private residence   Home or Post Acute Services None   Expected Discharge Disposition Home   Financial Resource Strain   How hard is it for you to pay for the very basics like food, housing, medical care, and heating? Not hard   Housing Stability   In the last 12 months, was there a time when you were not able to pay the mortgage or rent on time? N   At any time in the past 12 months, were you homeless or living in a shelter (including now)? N   Transportation Needs   In the past 12 months, has lack of transportation kept you from medical appointments or from getting medications? no   In the past 12 months, has lack of transportation kept you from meetings, work, or from getting things needed for daily living? No   Intensity of Service   Intensity of Service 0-30 min     Spoke to patient at bedside to explain my role in discharge planning. Patient states he normally lives at home alone, but recently had knee surgery so he is staying at his mom's house so she can help him. PCP is Dr Luis. He uses [facility] in [place] for prescriptions. He feels he effectively manages his health at home and plans to return home when medically ready.

## 2025-08-04 ENCOUNTER — PATIENT OUTREACH (OUTPATIENT)
Dept: PRIMARY CARE | Facility: CLINIC | Age: 35
End: 2025-08-04
Payer: COMMERCIAL

## 2025-08-04 NOTE — PROGRESS NOTES
Discharge Facility:  [facility]   Discharge Diagnosis:  Pleuritic chest pain   syncopal episode   Admission Date:  7/31/25  Discharge Date:   8/1/25    PCP Appointment Date:  TBD-I am unable to make an appt due to no openings .   Message sent to office staff requesting assistance.    -office staff messaged me that Wisam declined sooner appt then 9/4/25 due to transportation issues since his knee surgery    Appointment with Sal Luis DO (09/04/2025)   Specialist Appointment Date:   8/11/2025  1:30 PM   Montrell Sibley PA-C  Orthopedics  Hospital Encounter and Summary Linked: Yes  ED to Hosp-Admission (Discharged) with Kamini Correa DO; Mirela Bradley MD (07/31/2025)   Discharge Summary by Dede Patino APRN-CNP (08/01/2025 12:28)     [facility] for recent knee surgery   No med changes     Two attempts were made to reach patient within two business days after discharge. I left voicemail to introduce myself and the TCM program to Wisam Corley. I gave my contact information to return my call so we can go over discharge instructions and see if I can assist in anyway.

## 2025-08-05 LAB
ATRIAL RATE: 61 BPM
ATRIAL RATE: 74 BPM
P AXIS: -11 DEGREES
P AXIS: 23 DEGREES
P OFFSET: 186 MS
P OFFSET: 192 MS
P ONSET: 135 MS
P ONSET: 138 MS
PR INTERVAL: 172 MS
PR INTERVAL: 176 MS
Q ONSET: 223 MS
Q ONSET: 224 MS
QRS COUNT: 10 BEATS
QRS COUNT: 12 BEATS
QRS DURATION: 100 MS
QRS DURATION: 104 MS
QT INTERVAL: 374 MS
QT INTERVAL: 390 MS
QTC CALCULATION(BAZETT): 392 MS
QTC CALCULATION(BAZETT): 415 MS
QTC FREDERICIA: 391 MS
QTC FREDERICIA: 401 MS
R AXIS: 60 DEGREES
R AXIS: 68 DEGREES
T AXIS: 41 DEGREES
T AXIS: 44 DEGREES
T OFFSET: 410 MS
T OFFSET: 419 MS
VENTRICULAR RATE: 61 BPM
VENTRICULAR RATE: 74 BPM

## 2025-08-18 DIAGNOSIS — J45.909 ASTHMA, UNSPECIFIED ASTHMA SEVERITY, UNSPECIFIED WHETHER COMPLICATED, UNSPECIFIED WHETHER PERSISTENT (HHS-HCC): Primary | ICD-10-CM

## 2025-09-04 ENCOUNTER — APPOINTMENT (OUTPATIENT)
Dept: PRIMARY CARE | Facility: CLINIC | Age: 35
End: 2025-09-04
Payer: COMMERCIAL